# Patient Record
Sex: MALE | Race: WHITE
[De-identification: names, ages, dates, MRNs, and addresses within clinical notes are randomized per-mention and may not be internally consistent; named-entity substitution may affect disease eponyms.]

---

## 2017-01-28 ENCOUNTER — HOSPITAL ENCOUNTER (OUTPATIENT)
Dept: HOSPITAL 53 - M SLEEP | Age: 57
End: 2017-01-28
Attending: INTERNAL MEDICINE
Payer: COMMERCIAL

## 2017-01-28 DIAGNOSIS — G47.33: Primary | ICD-10-CM

## 2017-01-28 DIAGNOSIS — G47.61: ICD-10-CM

## 2017-01-30 NOTE — SLEEPCENT
DATE OF PROCEDURE:  01/28/2017

 

REFERRING PHYSICIAN: FRANCESCO Orozco

 

INTERPRETATION: Nocturnal polysomnography was performed for the determination of

pressure therapy in this person with presumed mild obstructive sleep apnea with

an respiratory event index (FAYE) of 14.1 with oxygen saturation stacy at 79% and

average saturation of 89%. There were associated symptoms of excessive daytime

sleepiness, insomnia, snoring, observed apnea, and nonrestorative sleep.

 

A total of 6 hours and 47 minutes of data was reviewed with 271 minutes of sleep

identified. Sleep latency was 12 minutes. Rapid eye movement (REM) latency was

240 minutes. No slow wave sleep was identified. Sleep efficiency was reduced at

67.8%. EKG showed normal sinus rhythm with an average heart rate of 65 beats 
per minute.

Speeding and slowing was noted surrounding some respiratory events. No

epileptiform discharge observed. The patient had been fit with a Respironics

ComfortGel nasal petite mask, 4 cm of water then applied to the circuit and the

lights were dimmed. Continuous positive airway pressure (CPAP) begun at 4 cm of

water pressure, taken to a high of 6. However, he appeared to do best on a

pressure of 5. On that pressure, his apnea-hypopnea index (AHI) was 0 and his 
FAYE

was 2.3. Oxygen saturation stacy appeared to be in the 90th percentile. Periodic

limb movement index was 14. Supine REM sleep was seen on this pressure with a

reasonably good waveform.

 

IMPRESSION:

1.  Obstructive sleep apnea, mild, reasonably palliated on CPAP of 5 cm of water

pressure.

2. Periodic limb movement, mild.

 

RECOMMENDATIONS:

Recommend continuation of CPAP therapy at the above pressure via a petite

Respironics ComfortGel nasal mask, the mask of his preference. Clinical

correlation will be necessary to ensure eradication of symptoms.

[facility]

## 2017-12-27 ENCOUNTER — HOSPITAL ENCOUNTER (EMERGENCY)
Dept: HOSPITAL 53 - M ED | Age: 57
Discharge: HOME | End: 2017-12-27
Payer: COMMERCIAL

## 2017-12-27 DIAGNOSIS — J44.1: Primary | ICD-10-CM

## 2017-12-27 DIAGNOSIS — Z79.899: ICD-10-CM

## 2017-12-27 DIAGNOSIS — K21.9: ICD-10-CM

## 2017-12-27 DIAGNOSIS — F41.9: ICD-10-CM

## 2017-12-27 DIAGNOSIS — F17.210: ICD-10-CM

## 2017-12-27 DIAGNOSIS — R53.81: ICD-10-CM

## 2017-12-27 PROCEDURE — 71020: CPT

## 2017-12-31 LAB
B BURGDOR IGG+IGM SER-ACNC: 1.17 ISR (ref 0–0.9)
B BURGDOR IGM SER IA-ACNC: 4.05 INDEX (ref 0–0.79)

## 2018-02-12 ENCOUNTER — HOSPITAL ENCOUNTER (OUTPATIENT)
Dept: HOSPITAL 53 - M RAD | Age: 58
End: 2018-02-12
Attending: INTERNAL MEDICINE
Payer: COMMERCIAL

## 2018-02-12 DIAGNOSIS — Z87.891: ICD-10-CM

## 2018-02-12 DIAGNOSIS — J44.9: Primary | ICD-10-CM

## 2020-04-27 ENCOUNTER — HOSPITAL ENCOUNTER (OUTPATIENT)
Dept: HOSPITAL 53 - M RAD | Age: 60
End: 2020-04-27
Attending: SPECIALIST
Payer: COMMERCIAL

## 2020-04-27 DIAGNOSIS — J84.10: ICD-10-CM

## 2020-04-27 DIAGNOSIS — Z12.2: Primary | ICD-10-CM

## 2020-04-27 DIAGNOSIS — Z87.891: ICD-10-CM

## 2020-04-27 DIAGNOSIS — J98.11: ICD-10-CM

## 2020-04-27 NOTE — REP
CT CHEST WITHOUT CONTRAST:  LOW-DOSE CANCER SCREENING.

 

HISTORY:  Nicotine dependence.

 

Comparison CT studies of the chest are reviewed from February 12, 2018, November 4, 2014, and February 8, 2010.

 

FINDINGS:  Digital  radiograph demonstrates hyperinflation.  Axial CT images

again show severe emphysematous hyperinflation and oligemia in the upper lobes

and to a slightly lesser extent, in the lower lobes.  This is unchanged in

appearance morphologically.  No pulmonary mass lesion is appreciated.  No

significant pulmonary nodule is seen.  No endobronchial lesion is appreciated.

No pleural or pericardial effusion is seen.  There is a chronic area of fibrosis

and discoid atelectasis medially in the right middle lobe, unchanged.

 

IMPRESSION:

 

Lung-RADS category 2 findings.  Repeat screening chest CT study suggested in 1

year.

 

 

Electronically Signed by

Rolando Mullins MD 04/27/2020 01:36 P

## 2020-12-10 ENCOUNTER — HOSPITAL ENCOUNTER (OUTPATIENT)
Dept: HOSPITAL 53 - M LABSMTC | Age: 60
End: 2020-12-10
Attending: PEDIATRICS
Payer: SELF-PAY

## 2020-12-10 DIAGNOSIS — Z20.828: Primary | ICD-10-CM

## 2020-12-23 ENCOUNTER — HOSPITAL ENCOUNTER (OUTPATIENT)
Dept: HOSPITAL 53 - M SMT | Age: 60
End: 2020-12-23
Attending: NURSE PRACTITIONER
Payer: COMMERCIAL

## 2020-12-23 DIAGNOSIS — N40.1: Primary | ICD-10-CM

## 2020-12-23 LAB
APPEARANCE UR: CLEAR
BACTERIA UR QL AUTO: NEGATIVE
BILIRUB UR QL STRIP.AUTO: NEGATIVE
GLUCOSE UR QL STRIP.AUTO: NEGATIVE MG/DL
HGB UR QL STRIP.AUTO: NEGATIVE
KETONES UR QL STRIP.AUTO: NEGATIVE MG/DL
LEUKOCYTE ESTERASE UR QL STRIP.AUTO: NEGATIVE
NITRITE UR QL STRIP.AUTO: NEGATIVE
PH UR STRIP.AUTO: 6 UNITS (ref 5–9)
PROT UR QL STRIP.AUTO: NEGATIVE MG/DL
RBC # UR AUTO: 0 /HPF (ref 0–3)
SP GR UR STRIP.AUTO: 1 (ref 1–1.03)
SQUAMOUS #/AREA URNS AUTO: 0 /HPF (ref 0–6)
UROBILINOGEN UR QL STRIP.AUTO: 0.2 MG/DL (ref 0–2)
WBC #/AREA URNS AUTO: 0 /HPF (ref 0–3)

## 2020-12-29 ENCOUNTER — HOSPITAL ENCOUNTER (EMERGENCY)
Dept: HOSPITAL 53 - M ED | Age: 60
Discharge: HOME | End: 2020-12-29
Payer: COMMERCIAL

## 2020-12-29 VITALS — SYSTOLIC BLOOD PRESSURE: 123 MMHG | DIASTOLIC BLOOD PRESSURE: 66 MMHG

## 2020-12-29 VITALS — WEIGHT: 114.42 LBS | BODY MASS INDEX: 19.53 KG/M2 | HEIGHT: 64 IN

## 2020-12-29 DIAGNOSIS — K27.9: Primary | ICD-10-CM

## 2020-12-29 DIAGNOSIS — Z87.891: ICD-10-CM

## 2020-12-29 DIAGNOSIS — K44.9: ICD-10-CM

## 2020-12-29 DIAGNOSIS — Z79.899: ICD-10-CM

## 2020-12-29 DIAGNOSIS — J45.909: ICD-10-CM

## 2020-12-29 DIAGNOSIS — K21.9: ICD-10-CM

## 2020-12-29 DIAGNOSIS — J44.9: ICD-10-CM

## 2020-12-29 DIAGNOSIS — F32.9: ICD-10-CM

## 2020-12-29 DIAGNOSIS — F41.9: ICD-10-CM

## 2020-12-29 LAB
ALBUMIN SERPL BCG-MCNC: 3.7 GM/DL (ref 3.2–5.2)
ALT SERPL W P-5'-P-CCNC: 22 U/L (ref 12–78)
BASOPHILS # BLD AUTO: 0.1 10^3/UL (ref 0–0.2)
BASOPHILS NFR BLD AUTO: 0.8 % (ref 0–1)
BILIRUB CONJ SERPL-MCNC: 0.2 MG/DL (ref 0–0.2)
BILIRUB SERPL-MCNC: 0.5 MG/DL (ref 0.2–1)
BUN SERPL-MCNC: 8 MG/DL (ref 7–18)
CALCIUM SERPL-MCNC: 9.3 MG/DL (ref 8.5–10.1)
CHLORIDE SERPL-SCNC: 102 MEQ/L (ref 98–107)
CK MB CFR.DF SERPL CALC: 2.29
CK MB SERPL-MCNC: 2.5 NG/ML (ref ?–3.6)
CK SERPL-CCNC: 109 U/L (ref 39–308)
CO2 SERPL-SCNC: 33 MEQ/L (ref 21–32)
CREAT SERPL-MCNC: 0.76 MG/DL (ref 0.7–1.3)
EOSINOPHIL # BLD AUTO: 0 10^3/UL (ref 0–0.5)
EOSINOPHIL NFR BLD AUTO: 0.1 % (ref 0–3)
GFR SERPL CREATININE-BSD FRML MDRD: > 60 ML/MIN/{1.73_M2} (ref 56–?)
GLUCOSE SERPL-MCNC: 98 MG/DL (ref 70–100)
HCT VFR BLD AUTO: 48 % (ref 42–52)
HGB BLD-MCNC: 15.5 G/DL (ref 13.5–17.5)
LYMPHOCYTES # BLD AUTO: 1.5 10^3/UL (ref 1.5–5)
LYMPHOCYTES NFR BLD AUTO: 19.1 % (ref 24–44)
MCH RBC QN AUTO: 32.4 PG (ref 27–33)
MCHC RBC AUTO-ENTMCNC: 32.3 G/DL (ref 32–36.5)
MCV RBC AUTO: 100.2 FL (ref 80–96)
MONOCYTES # BLD AUTO: 0.8 10^3/UL (ref 0–0.8)
MONOCYTES NFR BLD AUTO: 10.2 % (ref 0–5)
NEUTROPHILS # BLD AUTO: 5.4 10^3/UL (ref 1.5–8.5)
NEUTROPHILS NFR BLD AUTO: 69.4 % (ref 36–66)
PLATELET # BLD AUTO: 330 10^3/UL (ref 150–450)
POTASSIUM SERPL-SCNC: 4.4 MEQ/L (ref 3.5–5.1)
PROT SERPL-MCNC: 7.5 GM/DL (ref 6.4–8.2)
RBC # BLD AUTO: 4.79 10^6/UL (ref 4.3–6.1)
SODIUM SERPL-SCNC: 139 MEQ/L (ref 136–145)
TROPONIN I SERPL-MCNC: < 0.02 NG/ML (ref ?–0.1)
WBC # BLD AUTO: 7.8 10^3/UL (ref 4–10)

## 2020-12-29 PROCEDURE — 85025 COMPLETE CBC W/AUTO DIFF WBC: CPT

## 2020-12-29 PROCEDURE — 93005 ELECTROCARDIOGRAM TRACING: CPT

## 2020-12-29 PROCEDURE — 74177 CT ABD & PELVIS W/CONTRAST: CPT

## 2020-12-29 PROCEDURE — 82550 ASSAY OF CK (CPK): CPT

## 2020-12-29 PROCEDURE — 82553 CREATINE MB FRACTION: CPT

## 2020-12-29 PROCEDURE — 71045 X-RAY EXAM CHEST 1 VIEW: CPT

## 2020-12-29 PROCEDURE — 99285 EMERGENCY DEPT VISIT HI MDM: CPT

## 2020-12-29 PROCEDURE — 80048 BASIC METABOLIC PNL TOTAL CA: CPT

## 2020-12-29 PROCEDURE — 80076 HEPATIC FUNCTION PANEL: CPT

## 2020-12-29 PROCEDURE — 94760 N-INVAS EAR/PLS OXIMETRY 1: CPT

## 2020-12-29 PROCEDURE — 84484 ASSAY OF TROPONIN QUANT: CPT

## 2020-12-29 PROCEDURE — 93041 RHYTHM ECG TRACING: CPT

## 2020-12-29 RX ADMIN — DIATRIZOATE MEGLUMINE AND DIATRIZOATE SODIUM SCH ML: 600; 100 SOLUTION ORAL; RECTAL at 19:34

## 2020-12-29 RX ADMIN — DIATRIZOATE MEGLUMINE AND DIATRIZOATE SODIUM SCH ML: 600; 100 SOLUTION ORAL; RECTAL at 20:01

## 2020-12-29 NOTE — REPVR
PROCEDURE INFORMATION: 



Exam: CT Abdomen and Pelvis with Contrast 

Exam date and time: 12/29/20  (8:27pm) 

Age: 60 years old 

Clinical indication:  Epigastric pain  



TECHNIQUE: 

Imaging protocol: Computed tomography of the abdomen and pelvis with 

intravenous contrast. 

Radiation optimization: All CT scans at this facility use at least one of these 

dose optimization techniques: automated exposure control; mA and/or kV 

adjustment per patient size (includes targeted exams where dose is matched to 

clinical indication); or iterative reconstruction. 

Contrast material: Isovue 370 Contrast volume: 100 ml  Contrast route:  IV  



COMPARISON: 

No relevant prior studies available 



FINDINGS: 

Lower lung fields:  Emphysematous lung changes.  No basilar infiltrates.  No 

pleural effusions.

Liver: Normal. No solid mass. 

Gallbladder and bile ducts: Normal. No calcified stones. No ductal dilatation. 

Pancreas: Normal. No ductal dilatation. 

Spleen: Normal. No splenomegaly. 

Adrenal glands: Normal. No mass. 

Kidneys and ureters: Normal. No hydronephrosis. 

Stomach and bowel:  Findings in the left mid abdomen compatible with a 

non-obstructing small bowel (probably jejeunal) intussusception.  A 'bull's 

eye' appearance of small bowel is noted (Ser. 201 - Image #45).  

   Much fecal matter in the colon (especially from the cecum to the splenic 

flexure).

Appendix: No evidence of appendicitis. 



Intraperitoneal space: Unremarkable. No free air. No significant fluid 

collection. 

Vasculature: Unremarkable. No abdominal aortic aneurysm. 

Lymph nodes: Unremarkable. No enlarged lymph nodes. 



Urinary bladder:  Distended urinary bladder.  No stones nor mass. 

Reproductive:  Enlarged prostate gland.



Bones/joints: Unremarkable. No acute fracture. 

Soft tissues:  Paucity of fat in the abdomen and pelvis. 



IMPRESSION: 

Suspect a non-obstructing jejunal intussusception, in the left mid abdomen.

No abscess.  No free air.

Much fecal matter in the colon -- perhaps an element of constipation, eg.



Electronically signed by: Ilana Thakkar On 12/29/2020  21:27:10 PM

## 2020-12-29 NOTE — REP
INDICATION:

DYSPNEA/COUGH.



COMPARISON:

Comparison chest x-ray December 27, 2017.



TECHNIQUE:

Portable upright AP chest radiograph.



FINDINGS:

The lungs are hyperinflated with emphysematous changes in the upper lobes bilaterally

consistent with COPD.  Interstitial markings are very slightly prominent in the bases.

These findings are unchanged.  The heart is not enlarged.  Monitoring electrodes are

seen.  Pulmonary vasculature is not increased..   Six no significant bony abnormality

is seen.



IMPRESSION:

Evidence of COPD with hyperinflation and emphysematous changes in the upper lobes.  No

acute infiltrate..





<Electronically signed by Yohan Mullins > 12/29/20 1713

## 2020-12-31 NOTE — ECGEPIP
[facility] - ED

                                       

                                       Test Date:    2020

Pat Name:     LORI MARX              Department:   

Patient ID:   [id number]                 Room:         -

Gender:       Male                     Technician:   

:          1960               Requested By: NICHOLE MINOR

Order Number: [id number]     Reading MD:   Lilia Echavarria

                                 Measurements

Intervals                              Axis          

Rate:         82                       P:            82

GA:           164                      QRS:          49

QRSD:         93                       T:            72

QT:           346                                    

QTc:          406                                    

                           Interpretive Statements

SINUS RHYTHM

NONSPECIFIC ST ELEVATION, CLINICAL CORRELATION

NO PRIOR

Electronically Signed on 2020 13:05:40 EST by Lilia Echavarria

## 2021-01-29 ENCOUNTER — HOSPITAL ENCOUNTER (EMERGENCY)
Dept: HOSPITAL 53 - M ED | Age: 61
Discharge: HOME | End: 2021-01-29
Payer: COMMERCIAL

## 2021-01-29 VITALS — SYSTOLIC BLOOD PRESSURE: 106 MMHG | DIASTOLIC BLOOD PRESSURE: 67 MMHG

## 2021-01-29 VITALS — WEIGHT: 111.33 LBS | HEIGHT: 64 IN | BODY MASS INDEX: 19.01 KG/M2

## 2021-01-29 DIAGNOSIS — I25.2: ICD-10-CM

## 2021-01-29 DIAGNOSIS — Z87.891: ICD-10-CM

## 2021-01-29 DIAGNOSIS — F32.9: ICD-10-CM

## 2021-01-29 DIAGNOSIS — R43.9: ICD-10-CM

## 2021-01-29 DIAGNOSIS — Z20.822: ICD-10-CM

## 2021-01-29 DIAGNOSIS — Z79.899: ICD-10-CM

## 2021-01-29 DIAGNOSIS — J06.9: Primary | ICD-10-CM

## 2021-01-29 DIAGNOSIS — J45.909: ICD-10-CM

## 2021-01-29 DIAGNOSIS — J43.9: ICD-10-CM

## 2021-01-29 DIAGNOSIS — R51.9: ICD-10-CM

## 2021-01-29 DIAGNOSIS — F41.9: ICD-10-CM

## 2021-01-29 DIAGNOSIS — R68.83: ICD-10-CM

## 2021-01-29 DIAGNOSIS — K21.9: ICD-10-CM

## 2021-01-29 DIAGNOSIS — J44.9: ICD-10-CM

## 2021-01-29 DIAGNOSIS — K57.92: ICD-10-CM

## 2021-01-29 DIAGNOSIS — A69.20: ICD-10-CM

## 2021-01-29 PROCEDURE — 71046 X-RAY EXAM CHEST 2 VIEWS: CPT

## 2021-01-29 PROCEDURE — 99283 EMERGENCY DEPT VISIT LOW MDM: CPT

## 2021-01-29 PROCEDURE — 87804 INFLUENZA ASSAY W/OPTIC: CPT

## 2021-01-29 NOTE — CCD
Summarization of Episode Note

                             Created on: 2021



CHINGLORI HARRINGTON

External Reference #: [id number]

: 1960

Sex: Male



Demographics





                          Address                   [address]

[place]  [address]

 

                          Home Phone                [phone]

 

                          Preferred Language        Unknown

 

                          Marital Status            Unknown

 

                          Yarsanism Affiliation     Unknown

 

                          Race                      White

 

                          Ethnic Group              Not  or 





Author





                          Author                    [facility]

[facility]

 

                          Organization              [facility]

[facility]

 

                          Address                   Unknown

 

                          Phone                     Unavailable







Support





                Name            Relationship    Address         Phone

 

                    LORI MARX        MARISABEL                [address]

[place], [place]  [id number] [phone]

 

                    David Marx          PRETTY                [address]

[place]  [address]                     [phone]







Care Team Providers





                    Care Team Member Name Role                Phone

 

                    Erick Pascual      Unavailable         [phone]







PROBLEMS





          Type      Condition ICD9-CM Code QME87-GS Code Onset Dates Condition S

tatus SNOMED 

Code                                    Notes

 

             Problem      Benign prostatic hyperplasia with lower urinary tract 

symptoms              N40.1         

                    Active              [id number]       

 

        Problem Balanitis         N48.1           Active  [id number]  







ALLERGIES

No Known Allergies



ENCOUNTERS from 1960 to 2021





             Encounter    Location     Date         Provider     Diagnosis

 

                [facility]    [address] [place] [address] 23 Dec

, 2020    Erick Pascual

                                        Benign prostatic hyperplasia with lower 

urinary tract symptoms N40.1 ; Balanitis

N48.1 and Prostate cancer screening Z12.5







IMMUNIZATIONS

No Information



SOCIAL HISTORY

Tobacco Use:



                    Social History Observation Description         Date

 

                    Details (start date - stop date) Former Smoker        



Sex Assigned At Birth:



                          Social History Observation Description

 

                          Sex Assigned At Birth     Unknown



Sexual Hx:



                    Question            Answer              Notes

 

                    Had sex in the last 12 months (vaginal, oral, or anal)? Yes 

                 

 

                    Have you ever had an STD? Yes                  

 

                    with                Women only           

 

                    Use protection?     No                   



Alcohol Screening:



                    Question            Answer              Notes

 

                    Did you have a drink containing alcohol in the past year? No

                   

 

                    Points              0                    

 

                    Interpretation      Negative             



Tobacco Use:



                    Question            Answer              Notes

 

                    Are you a:          former smoker       quit 2 years ago







REASON FOR REFERRAL

No Information



VITAL SIGNS





                    Weight              110 lbs             23 Dec, 2020

 

                    Height              64'' in             23 Dec, 2020

 

                    BMI                 0.13 kg/m2          23 Dec, 2020

 

                    Heart Rate          86 /min             23 Dec, 2020

 

                    Respiratory Rate    20 /min             23 Dec, 2020

 

                    Temperature         98.2 degrees Fahrenheit 23 Dec, 2020

 

                    Oximetry            91%                 23 Dec, 2020

 

                    Blood pressure systolic 122 mm Hg           23 Dec, 2020

 

                    Blood pressure diastolic 82 mm Hg            23 Dec, 2020







MEDICATIONS





           Medication SIG (Take, Route, Frequency, Duration) Notes      Start Da

te End Date   

Status

 

           Levalbuterol Tartrate 45 MCG/ACT 1 puff as needed Inhalation every 4 

hrs                                  

Active

 

           Mucinex 600 MG 1 tablet as needed Orally every 12 hrs                

                  Not-Taking

 

                          Ketoconazole 2 %          1 application to affected ar

ea Externally Once a day for 30 

days                            29 Dec, 2016                    Not-Taking

 

           Anoro Ellipta 62.5-25 MCG/INH 1 puff Inhalation Once a day           

                       Not-Taking

 

           Albuterol-Ipratropium 2.5-0.5 MG/3ML 3 ml Inhalation every 6 hrs     

                             Active

 

           Multi Complete -  Orally                                     Not-Taki

ng

 

           Apple Cider Vinegar                                             Activ

e

 

           Omeprazole 40 MG 1 capsule Orally Once a day                         

         Not-Taking

 

                Cipro 500 MG    1 tablet Orally ONE HOUR PRIOR TO PROCEDURE for 

1 dose(s)                 20 Mar,

2017                                                Not-Taking

 

                Flonase Allergy Relief 50 MCG/ACT 1 spray in each nostril Nasall

y Once a day                  

                                                    Not-Taking

 

                Ketoconazole 2 % 1 application to affected area Externally BID f

or 30 day(s)                 

                                        Active

 

           Budesonide 1 MG/2ML 4 ml Inhalation Four times a day                 

                 Active

 

           Spiriva HandiHaler                                             Active

 

           Simvastatin 20 MG 1 tablet in the evening Orally Once a day for 30 da

y(s)                                  

Active

 

           Diflucan 100 MG 1 tablet Orally Once a day for 7 day(s)            05

 Dec, 2016            

Not-Taking

 

           Stiolto Respimat 2.5-2.5 MCG/ACT 2 puffs Inhalation Once a day       

                           Active

 

                          Sulfamethoxazole-Trimethoprim 800-160 MG 1 tablet 1 ho

ur prior to your 

cystoscopy Orally Once for 1 days                 23 Dec, 2020                  

  Active

 

           Vitamin D3 Ultra Potency 1.25 MG (74374 UT) 1 tablet Orally for 30 da

y(s)                                  

Active

 

           Flomax 0.4 MG 1 capsule Orally Once a day for 30                     

             Active

 

           Ginseng                                                Active

 

           Pantoprazole Sodium 40 MG 1 tablet Orally Once a day for 30 day(s)   

                               Active

 

           Aspir-Low 81 MG 1 tablet Orally Once a day for 30 day(s)             

                     Active

 

           Clotrimazole 10 MG 1 ed Mouth/Throat Five times a day for 14 day(

s)                                  

Active

 

                          Lidocaine HCl Jelly USP 2 % 5 ML1 application to affec

michael area as needed 

Intravesically TIME AT CYSTOSCOPY for 1 dose(s)                     

                Not-Taking

 

           BusPIRone HCl 7.5 MG 1 tablet Orally Twice a day                     

             Not-Taking

 

             Pepcid 20 MG 1 tablet at bedtime as needed Orally Once a day for 30

 day(s)                            

                                        Active

 

           Simethicone                                             Active







PROCEDURES

No Information



RESULTS





                    Component           Value               Reference Range

 

                                        UA URINALYSIS

Reviewed date:2020 14:33:51

Interpretation:

Performing Lab:[facility] [address] [facility] [address] [phone], Phone:[phone],[facility] 

 

                                        URINE CULTURE

Reviewed date:2020 09:28:14

Interpretation:

Performing Lab:[facility] [address] [facility] [address] [phone], Phone:[phone],[facility] 







REASON FOR VISIT

bph



MEDICAL (GENERAL) HISTORY





                    Type                Description         Date

 

                    Medical History     COPD                 

 

                    Medical History     GERD                 

 

                    Surgical History    colonoscopy         

 

                    Surgical History    hemorroidectomy      

 

                    Surgical History    tonsil removed at 13  







Goals Section

No Information



Health Concerns

No Information



MEDICAL EQUIPMENT

No Information



MENTAL STATUS

No Information



FUNCTIONAL STATUS

No Information



ASSESSMENTS





             Encounter Date Diagnosis    Assessment Notes Treatment Notes Treatm

ent Clinical 

Notes

 

                          23 Dec, 2020              Benign prostatic hyperplasia

 with lower urinary tract symptoms 

(ICD-10 - N40.1)                                    



                                        



Will send UA and culture today.



PVR today is 12 cc.



His obstructive LUTS are getting worse despite being on Flomax.  Recommended 
doing a cystoscopy to better evaluate.  Procedure and consent reviewed and 
signed, abx sent to pharmacy.

 

                23 Dec, 2020    Balanitis (ICD-10 - N48.1)                 



                                        



Will give a refill of cream to help with intermintent balantis.

 

                23 Dec, 2020    Prostate cancer screening (ICD-10 - Z12.5)      

           



                                        



Order given to do PSA prior to next visit.

 

                23 Dec, 2020    Other                           Patient Educated

 with: Cystoscopy (In-Office) Procedure and

Instructions.pdf (Cystoscopy (In-Office) Procedure and 
Instructions.pdf),Cystoscopy material was printed,Cystoscopy material was 
printed,Cystoscopy home care material was printed  







PLAN OF TREATMENT

Medication



                Medication Name Sig             Start Date      Stop Date

 

                          Sulfamethoxazole-Trimethoprim 800-160 MG 1 tablet 1 ho

ur prior to your 

cystoscopy Orally Once for 1 days 23 Dec, 2020               

 

                    Ketoconazole 2 %    1 application to affected area Externall

y BID for 30 day(s)                                 



Treatment Notes



                    Assessment          Notes               Clinical Notes

 

                    Benign prostatic hyperplasia with lower urinary tract sympto

ms                     Will send UA 

and culture today.PVR today is 12 cc.His obstructive LUTS are getting worse 
despite being on Flomax.  Recommended doing a cystoscopy to better evaluate.  
Procedure and consent reviewed and signed, abx sent to pharmacy.

 

                    Beryl                               Will give a refill o

f cream to help with intermintent balantis.

 

                    Prostate cancer screening                     Order given to

 do PSA prior to next visit.



Treatment Notes



                          Test Name                 Order Date

 

                          PSA SCREENING             2021

 

                          uro PVR (Post Voiding Residual) Bladder Scan 

4



Next Appt



                                        Details

 

                                        cystoscopy Reason:BPH with LUTS

 

                                        Provider Name:Martin Finney,  08:00:00 AM, [address], 

[place], [address], [phone]



Follow Up:cystoscopyBPH with LUTS



Insurance Providers





             Payer Name   Payer Address Payer Phone  Insured Name Patient Relati

onship to 

Insured                   Coverage Start Date       Coverage End Date

 

                [employer] [facility] [address]  [place] [address] [phone]    

LORI MARX       self

## 2021-01-29 NOTE — REP
INDICATION:

sob.



COMPARISON:

Comparison chest x-ray December 29, 2020.



TECHNIQUE:

Two views..



FINDINGS:

The lungs are quite hyperinflated consistent with COPD.  No acute infiltrate is seen.

Pleural angles are sharp.  Diaphragms are somewhat inverted.  Emphysematous changes

are noted throughout the upper lung zones as before.  Heart size is normal.  No

significant bony abnormality is seen.  The



IMPRESSION:

Significant hyperinflation consistent with advanced COPD emphysematous changes in the

upper lobes.  No infiltrate seen..





<Electronically signed by Yohan Mullins > 01/29/21 1234

## 2021-01-29 NOTE — CCD
Continuity of Care Document (CCD)

                             Created on: 01/15/2021



Neftaly Guillory

External Reference #: [id number]

: 1960

Sex: Male



Demographics





                          Address                   [address] [place] [address]

 

                          Home Phone                [phone]

 

                          Preferred Language        Unknown

 

                          Marital Status            

 

                          Gnosticism Affiliation     Unknown

 

                          Race                      White

 

                          Ethnic Group              Not  or 





Author





                          Author                    Neftaly MORALES PA-C

 

                          Organization              Unknown

 

                          Address                   [address] [place] [address]



 

                          Phone                     [phone]







Care Team Providers





                    Care Team Member Name Role                Phone

 

                    [facility] AUTM                [phone]

 

                    [facility] AUTM                [phone]

 

                    Daisy Morales PA-C  AUTM                [phone]

 

                    Sharath Castillo MD AUTM                [phone]

 

                    Solitario Latif MD    AUTM                [phone]

 

                    [facility]         AUTM                [phone]







Problems





                    Active Problems     Provider            Date

 

                    Obstructive sleep apnea syndrome JUAN ANTONIO Taylor  Onset: 

2019

 

                    Tobacco user        JUAN ANTONIO Taylor  Onset: 2019

 

                    Chronic obstructive lung disease JUAN ANTONIO Taylor  Onset: 

2019







Social History





                Type            Date            Description     Comments

 

                Birth Sex                       Unknown          

 

                Tobacco Use     Start: Unknown  Current Cigar Smoker 1 Daily  

 

                ETOH Use                        Denies alcohol use  

 

                Recreational Drug Use                 Denies Drug Use  

 

                Tobacco Use     Start: Unknown End: Unknown Patient is a former 

smoker quit  

 

                Smoking Status  Reviewed: 19 Patient is a former smoker qu

it  







Allergies, Adverse Reactions, Alerts





             Active Allergies Reaction     Severity     Comments     Date

 

             NKDA                                                2019

 

             NKFA                                                2019

 

             [employer]                                                2019







Medications





           Active Medications SIG        Qnty       Indications Ordering Provide

r Date

 

                          Pepcid                     20mg Tablets               

    1 by mouth twice a day

as needed gerd  180tabs                         Toro Ny MD 2020

 

                                        Vitamin D3 Ultra Potency                

     1.25mg (43871 Ut) Tablets          

             1 tab by mouth every week 12tabs                    Toro Ny MD 2020

 

           Patient Unable To Tolerate Cpap, Please Evaluate                     

             Toro Ny MD 

2019

 

                          Pantoprazole Sodium                     40mg Tablets D

R                   1 by 

mouth every day 30tabs                          Toro Ny MD 2019

 

                          Nebulizer Kit/Tubing/Mouthpiece                      K

it                   use 

with neb tx every 4-6 hours as needed. dx j44.1 4units                          

Toro Ny MD 

2019

 

                          Nebulizer                      Device                 

  use every 4-6 hours as 

needed for wheezing and/or shortness of breath 1units          J44.9           H

arari Ny MD 

2019

 

                          Simvastatin                     20mg Tablets          

         Take 1 Tablet By 

Mouth Every Day 90tabs          E78.5           Toro Ny MD 2019

 

                          Tamsulosin HCL                     0.4mg Capsules     

              1 tab by 

mouth every day 90caps                          Anat Sharma ANP-BC, PNP 

00

 

           Apple Cider Vinegar Tablets                                  Unknown 

   

 

                          Levalbuterol Tartrate                     45mcg/Act Ae

rosol                   

Inl 2 PFS PO Q 4 H prn                                 Unknown         

0

 

                          Stiolto Respimat                     2.5-2.5mcg/Act Ae

rosol                   

Inl 2 PFS PO qd                                 Unknown         

 

                          Budesonide                     1mg/2ML Suspension     

              Inhale 

Contents Of 2 Vials Via Nebulizer Twice A Day                                 Un

known         

 

                          Aspirin 81                     81mg Tablets DR        

           1 by mouth 

every day                                       Unknown         

 

           Clotrimazole                     10mg Zack                         

                           Unknown    



 

                          Sucralfate                     1gm Tablets            

       Take 1 Tablet By 

Mouth Four Times Daily For 4 Weeks 360tabs                         Toro doherty MD 







Immunizations





                                        Description

 

                                        No Information Available







Vital Signs





                Date            Vital           Result          Comment

 

                2020  9:27am BP Systolic     122 mmHg         

 

                    BP Diastolic        80 mmHg              

 

                    Heart Rate          100 /min             

 

                    Body Temperature    97.1 F             

 

                    Respiratory Rate    16 /min              

 

                    O2 % BldC Oximetry  96 %                 

 

                    Weight              113.25 lb            

 

                    Weight              51.370 kg            

 

                    Height              64 inches           5'4"

 

                    BMI (Body Mass Index) 19.4 kg/m2           

 

                    BSA (Body Surface Area) 1.54 m2              

 

                08/10/2020  8:54am BP Systolic     112 mmHg         

 

                    BP Diastolic        64 mmHg              

 

                    Heart Rate          90 /min              

 

                    Body Temperature    97.2 F             

 

                    Respiratory Rate    16 /min              

 

                    O2 % BldC Oximetry  95 %                 

 

                    Weight              106.38 lb            

 

                    Weight              48.252 kg            

 

                    Height              64 inches           5'4"

 

                    BMI (Body Mass Index) 18.3 kg/m2           

 

                    BSA (Body Surface Area) 1.50 m2              







Results





        Test    Acquired Date Facility Test    Result  H/L     Range   Note

 

                    CBC With Differential 2020          [facility]

             White Blood Count 7.8 10       Normal       4.0-10.0      

 

             Red Blood Count 4.79 10      Normal       4.30-6.10     

 

             Hemoglobin   15.5 g/dL    Normal       13.5-17.5     

 

             Hematocrit   48.0 %       Normal       42.0-52.0     

 

             Mean Corpuscular Volume 100.2 fl     High         80.0-96.0     

 

             Mean Corpuscular Hemoglobin 32.4 pg      Normal       27.0-33.0    

 

 

             Mean Corpuscular HGB Conc 32.3 g/dL    Normal       32.0-36.5     

 

             Red Cell Distribution Width 12.9 %       Normal       11.5-14.5    

 

 

             Platelet Count, Automated 330 10       Normal       150-450       

 

             Neutrophils % 69.4 %       High         36.0-66.0     

 

             Lymph %      19.1 %       Low          24.0-44.0     

 

             Mono %       10.2 %       High         0.0-5.0       

 

             Eos %        0.1 %        Normal       0.0-3.0       

 

             Baso %       0.8 %        Normal       0.0-1.0       

 

             Immature Granulocyte % 0.4 %        Normal       0-3.0         

 

             Nucleated Red Blood Cell % 0.0 %        Normal       0-0           

 

             Neutrophils # 5.4 10       Normal       1.5-8.5       

 

             Lymph #      1.5 10       Normal       1.5-5.0       

 

             Mono #       0.8 10       Normal       0.0-0.8       

 

             Eos #        0.0 10       Normal       0.0-0.5       

 

             Baso #       0.1 10       Normal       0.0-0.2       

 

                    Cardiac Marker Panel 2020          [facility]

             CPK Creatine Phosphokinase 109 U/L      Normal               

 

             CK-MB Value Mass 2.5 NG/ML    Normal       <3.6          

 

             MB/CK Relative Index 2.29         Normal       < Or =4      1

 

             Troponin I   < 0.02 NG/ML Normal       < 0.10       2

 

                    Liver Profile       2020          [facility]

             Ast/Sgot     20 U/L       Normal       7-37          

 

             Alt/SGPT     22 U/L       Normal       12-78         

 

             Alkaline Phosphatase 121 U/L      High                 

 

             Bilirubin,Total 0.5 mg/dL    Normal       0.2-1.0       

 

             Bilirubin,Direct 0.2 mg/dL    Normal       0.0-0.2       

 

             Total Protein 7.5 GM/DL    Normal       6.4-8.2       

 

             Albumin      3.7 GM/DL    Normal       3.2-5.2       

 

             Albumin/Globulin Ratio 1.0          Normal                     

 

                    Basic Metabolic Profile 2020          [facility]

             Glucose, Fasting 98 mg/dL     Normal               

 

             Blood Urea Nitrogen 8 mg/dL      Normal       7-18          

 

             Creatinine For GFR 0.76 mg/dL   Normal       0.70-1.30     

 

             Glomerular Filtration Rate > 60.0       Normal       >56          3

 

             Sodium Level 139 mEq/L    Normal       136-145       

 

             Potassium Serum 4.4 mEq/L    Normal       3.5-5.1       

 

             Chloride Level 102 mEq/L    Normal               

 

             Carbon Dioxide Level 33 mEq/L     High         21-32         

 

             Anion Gap    4 mEq/L      Low          8-16          

 

             Calcium Level 9.3 mg/dL    Normal       8.5-10.1      

 

                    Coronavirus 2019 ([facility]) 12/10/2020          [facility]

             Coronavirus 2019 ([facility]) ---------------- <SEE NOTE>                 

           4

 

                    Laboratory test finding 08/10/2020          [facility]

l

             Hgba1c       <pending>                               

 

             TSH Highly Sensitive <pending>                               

 

             Vitamin B12 Serum <pending>                               

 

             Vitamin D (25-Hydroxy) <pending>                               

 

             Folic Acid RBC <pending>                               

 

                    Lab - Unable To Process Specimen 08/10/2020          [facility]

             Lab - Specimen Rejec (SEE NOTE)                             5

 

             Test(s) Ordered FOLIC ACID RBC                             

 

             Rejection Reason QNS                                    6

 

                    Laboratory test finding 08/10/2020          [facility]

l

             Hgba1c       6.0 %                     4.4 - 6.1    7

 

                    CBC W/Automated Diff 08/10/2020          [facility]

             CBC W/Automated Diff (SEE NOTE)                             8

 

             WBC          6.7 10^3/uL               4.2 - 11.0    

 

             RBC          5.00 10^6/uL              4.50 - 6.30   

 

             Hemoglobin   16.6 g/dL    High         14.0 - 16.0   

 

             Hematocrit   49.7 %                    41.0 - 51.0   

 

             MCV          99.4 fL      High         80.0 - 94.0   

 

             MCH          33.2 pg                   27.0 - 34.0   

 

             MCHC         33.4 g/dL                 31.0 - 36.0   

 

             RDW          13.3 %                    11.5 - 14.8   

 

             Platelets    279 10^3/uL               150 - 450     

 

             MPV          8.8 fL                    7.4 - 10.4    

 

             Neut         69.6 %                    37.0 - 80.0   

 

             Lymph        18.4 %       Low          25.0 - 40.0   

 

             Mono         8.8 %        High         3.0 - 8.0     

 

             Eos          2.1 %                     0.0 - 7.0     

 

             Baso         1.0 %                     0.0 - 2.0     

 

             %Ig          0.1 %        High         0.0 - 0.0     

 

             %NRBC        0.0 %                     0.0 - 0.0     

 

             #Neut        4.64 10^3/uL              2.00 - 6.90   

 

             #Lymph       1.23 10^3/uL              0.60 - 3.40   

 

             #Mono        0.59 10^3/uL              0.00 - 0.90   

 

             #Eos         0.14 10^3/uL              0.00 - 0.70   

 

             #Baso        0.07 10^3/uL              0.00 - 0.20   

 

             #Ig          0.01 10^3/uL              0.00 - 0.10   

 

             #NRBC        0.00 10^3/uL              0.00 - 0.00   

 

             Manual Diff  SEE BELOW                               

 

             Segs         71 %                      37 - 80       

 

             %Lymph       18 %         Low          25 - 40       

 

             %Mono        10 %         High         3 - 8         

 

             %Eos         1 %                       0 - 7         

 

             RBC Morph    MORPH IS NORMAL                             

 

                    Comprehensive Metabolic Panel 08/10/2020          [facility]

             Comprehensive Metabo (SEE NOTE)                             9

 

             Sodium       141 mEq/L                 134 - 153     

 

             Potassium    4.5 mEq/L                 3.6 - 5.0     

 

             Chloride     100 mEq/L                 98 - 107      

 

             Co2          28 mEq/L                  22 - 30       

 

             Glucose      97 mg/dL                  65 - 110      

 

             BUN          11 mg/dL                  7 - 21        

 

             Creatinine   0.8 mg/dL                 0.7 - 1.5     

 

             BUN/Creat    14                        8 - 27        

 

             Total Protein 7.3 g/dL                  6.3 - 8.2     

 

             Albumin      4.7 g/dL                  3.9 - 5.0     

 

             Globulin     2.6 GM/DL                 2.4 - 3.2     

 

             A/G Ratio    1.8                       0.8 - 2.0     

 

             Calcium      9.5 mg/dL                 8.4 - 10.2    

 

             Total Bili   0.7 mg/dL                 0.2 - 1.3     

 

             Alkaline Phos 117 U/L                   38 - 126      

 

             Sgot/Ast     27 U/L                    5 - 40        

 

             SGPT/Alt     25 U/L                    7 - 56        

 

             Anion Gap    13.0 mmol/L               8.0 - 16.0    

 

             Age          60 yrs                                  

 

             Non-Aa GFR   >60 mL/min                              

 

             Afr Amer GFR >60 mL/min                             10

 

                    Cve Panel           08/10/2020          [facility]

             Cve Panel    (SEE NOTE)                             11

 

             Cholesterol  202 mg/dL    High         131 - 200     

 

             Triglycerides 88 mg/dL                  35 - 160      

 

             HDL          69 mg/dL                  29 - 86       

 

             LDL          117 mg/dL                 65 - 175      

 

             Risk Factor  2.9          Low          3.4 - 4.9     

 

             LDL/HDL      1.70                      1.00 - 3.55  12

 

                    Laboratory test finding 08/10/2020          [facility]

l

             TSH Highly Sensitive 0.89 uIU/mL               0.47 - 5.01   

 

             Vitamin D (25-Hydroxy) 23 NG/ML                               13

 

             Vitamin B12 Serum 1084 pg/mL                232 - 1245    







                          1                         DIAGNOSIS CRITERIA

MMB ng/ml       Relative Index (RI)

NON-AMI               < or = 5               N/A

GRAY ZONE              > 5                < or = 4

AMI                    > 5                   > 4



 

                          2                         Troponin I Reference Interva

l for Siemens Vista LOCI:



                                        99th Percentile= 0.00-0.045 ng/ml



Risk Stratification:

<= 0.10 ng/ml   Decreased Risk for Adverse Clinical

Events.

                                        0.10-1.50 ng/ml   Increased Risk for Adv

erse Clinical

Events. Evaluation of additional

criterion and/or repeat testing in 2-6

hours is suggested to rule out myocardial

damage.

>= 1.50 ng/ml   Indicative of Myocardial Injury.



 

                          3                         Units are mL/min/1.73 m2



Chronic Kidney Disease Staging per NKF:



Stage I & II   GFR >=60       Normal to Mildly Decreased

Stage III      GFR 30-59      Moderately Decreased

Stage IV       GFR 15-29      Severely Decreased

Stage V        GFR <15        Very Little GFR Left

ESRD           GFR <15 on RRT



 

                          4                         ----------------------------

--------------------------------

Test: COVID-19 Nasal/Naspharynx

Result: NOT DETECTED           Reference Units: Not detected

                                        ----------------------------------------

--------------------

Note: Please consider re-collection of a new specimen, if

clinically indicated.



Note: The COVID-19 assay is under Emergency Use

Authorization(EUA) by the U.S. Food and Drug Administration.



[facility] is designated as a high complexity

laboratory by the Clinical Laboratory Improvement Amendments

of 1988(CLIA) and is qualified to perform this test.

ASSAY INFORMATION: Real Time RT-PCR





 

                          5                         Specimen Integrity/Specimen 

Recollection

The patient sample needs to be resubmitted for the following reason:



 

                          6                         {          One or more of th

e tests you ordered cannot be performed.

{          Please recollect, reorder, and resubmit if needed.



 

                          7                         {A1]

{HB]



 

                          8                         COMPLETE BLOOD COUNT



 

                          9                         COMPREHENSIVE METABOLIC PANE

L



 

                          10                        Male GFR Interprentation

                                        20-49 yrs     >60 mL/min   Normal

                                        50-59 yrs     >56 mL/min   Normal

                                        60-69 yrs     >49 mL/min   Normal

                                        70-79yrs      >42 mL/min   Normal

                                        80 and above  >35 mL/min   Normal

Female GFR  Interpretation

                                        20-39 yrs     >60 mL/min   Normal

                                        40-49 yrs     >58 mL/min   Normal

                                        50-59 yrs     >51 mL/min   Normal

                                        60-69 yrs     >45 mL/min   Normal

                                        70-79 yrs     >39 mL/min   Normal

                                        80 and above  >32 mL/min   Normal



 

                          11                        LIPID PANEL



 

                          12                        CVE

RISK           CHOL/HDL       LDL/HDL

MEN:

                                        1/2  AVERAGE          3.43          1.00

AVERAGE          4.97          3.55

                                        2X   AVERAGE          9.55          6.25

                                        3X   AVERAGE         23.99          7.99

WOMEN:

                                        1/2  AVERAGE          3.27          1.47

AVERAGE          4.44          3.22

                                        2X   AVERAGE          7.05          5.03

                                        3X   AVERAGE         11.04          6.14



 

                          13                        VITAMIN-D(25HYDROXY)

Deficiency: <=20 ng/ml

Insufficiency: 21-29 ng/ml

Preferred level: => 30 ng/ml









Procedures





                                        Description

 

                                        No Information Available







Medical Devices





                                        Description

 

                                        No Information Available







Encounters





                                        Description

 

                                        No Information Available







Assessments





                Date            Code            Description     Provider

 

                2020      G47.33          Obstructive sleep apnea (adult) 

(pediatric) Daisy Morales PA-C

 

                2020      J44.9           Chronic obstructive pulmonary di

sease, unspecified Daisy Morales PA-C

 

                2020      K21.9           Gastro-esophageal reflux disease

 without esophagitis Daisy Morales PA-C

 

                2020      B37.0           Candidal stomatitis Daisy Morales PA-C

 

                    08/10/2020          Z00.01              Encounter for genera

l adult medical examination with abnormal 

findings                                Daisy Morales PA-C

 

                08/10/2020      R14.0           Abdominal distension (gaseous) E

jennifer Morales PA-C

 

                08/10/2020      G47.33          Obstructive sleep apnea (adult) 

(pediatric) Daisy Morales PA-C

 

                08/10/2020      R23.3           Spontaneous ecchymoses Daisy rabago PA-C

 

                08/10/2020      J44.9           Chronic obstructive pulmonary di

sease, unspecified Daisy Morales PA-C







Plan of Treatment

Future Appointment(s):* 02/15/2021  9:00 am - Daisy Morales PA-C at [facility]





Functional Status





                                        Description

 

                                        No Information Available







Mental Status





                                        Description

 

                                        No Information Available







Referrals





                Refer to      Reason for Referral Status          Appt Date

 

                    [facility]         60 year old male with BPH. Please evalua

te and treat. Thank you.  

Patient Notified                        2020

 

                                        [id number] [facility] [address]

 

                                        [place] [address]

 

                                        [phone]

 

                                          

 

                          Solitario Latif MD          60 year old male with histor

y of weight loss, abdominal 

discomfort, reflux, excessive bloating, and gas. History of colonic polyps. Last
endoscopy/colonoscopy at [facility] 2016. Please evaluate and treat.  Patient 

Notified                                2021

 

                                        [address]

 

                                        [place] [address]

 

                                        [phone]

## 2021-01-29 NOTE — CCD
Summarization Of Episode

                             Created on: 2021



LORI MARX

External Reference #: [id number]

: 1960

Sex: Male



Demographics





                          Address                   [address]

[place]  [address]

 

                          Home Phone                [phone]

 

                          Preferred Language        Unknown

 

                          Marital Status            

 

                          Jewish Affiliation     NONE

 

                          Race                      White

 

                          Ethnic Group              Not  or 





Author





                          Author                    [facility]

 

                          Organization              [facility]

 

                          Address                   Unknown

 

                          Phone                     Unavailable







Support





                Name            Relationship    Address         Phone

 

                [facility]        [place] [address] Unavailable

 

                    NONE, PT PER        Next Of Kin         -

                                        -

                          -, -  -                   -

 

                    [facility]      Next Of Kin         [place]

[place]  [address]                    [phone]

 

                    [facility] [place]  [address]                    [phone]

 

                    DAVID MARX         Next Of Derek         [address]

[place]  [address]                     [phone]

 

                    David Marx         ECON                [address]

[place]  [address]                     Unavailable







Care Team Providers





                    Care Team Member Name Role                Phone

 

                    TABBY Olivares Unavailable         Unavailable

 

                    MolluraTABBY PA Unavailable         Unavailable

 

                    Mollura, E Natasha PA Unavailable         Unavailable

 

                    Mollura, TABBY Kaur PA Unavailable         Unavailable

 

                    Mollura, E Natasha PA Unavailable         Unavailable

 

                    Mollura, TABBY Kaur PA Unavailable         Unavailable

 

                    Mollura, TABBY Kaur PA Unavailable         Unavailable

 

                    Mollura, E Natasha PA Unavailable         Unavailable

 

                    Mollura, E Natasha PA Unavailable         Unavailable

 

                    Mollura, E Natasha PA Unavailable         Unavailable

 

                    Mollura, E Natasha PA Unavailable         Unavailable

 

                    Mollura, E Natasha PA Unavailable         Unavailable

 

                    Mollura, E Natasha PA Unavailable         Unavailable

 

                    Mollura E Natasha PA Unavailable         Unavailable

 

                    Mollura E Natasha PA Unavailable         Unavailable

 

                    Mollura, E Natasha PA Unavailable         Unavailable

 

                    Mollura, E Natasha PA Unavailable         Unavailable

 

                    Mollura, E Natasha PA Unavailable         Unavailable

 

                    Mollura, E Natasha PA Unavailable         Unavailable

 

                    Mollura, E Natasha PA Unavailable         Unavailable

 

                    Mollura, E Natasha PA Unavailable         Unavailable

 

                    Mollura, E Natasha PA Unavailable         Unavailable

 

                    Mollura, E Natasha PA Unavailable         Unavailable

 

                    Mollura, E Natasha PA Unavailable         Unavailable

 

                    Mollura, E Natasha PA Unavailable         Unavailable

 

                    Mollura, E Natasha PA Unavailable         Unavailable

 

                    Mollura, E Natasha PA Unavailable         Unavailable

 

                    Mollura, E Natasha PA Unavailable         Unavailable

 

                    Mollura, E Natasha PA Unavailable         Unavailable

 

                    Mollura, E Natasha PA Unavailable         Unavailable

 

                    Mollura, E Natasha PA Unavailable         Unavailable

 

                    Mollura, E Natasha PA Unavailable         Unavailable

 

                    Mollura, E Natasha PA Unavailable         Unavailable

 

                    Mollura, E Natasha PA Unavailable         Unavailable

 

                    Mollura, E Natasha PA Unavailable         Unavailable

 

                    Mollura, E Natasha PA Unavailable         Unavailable

 

                    Nicole Nath MD  Unavailable         Unavailable

 

                    Nicole Nath MD  Unavailable         Unavailable

 

                    Nicole Nath MD  Unavailable         Unavailable

 

                    Nicole Nath MD  Unavailable         Unavailable

 

                    Nicole Nath MD  Unavailable         Unavailable

 

                    Nicole Nath MD  Unavailable         Unavailable

 

                    Nicole Nath MD  Unavailable         Unavailable

 

                    Nicole Nath MD  Unavailable         Unavailable

 

                    Nicole Nath MD  Unavailable         Unavailable

 

                    Nicole Nath MD  Unavailable         Unavailable

 

                    Nicole Nath MD  Unavailable         Unavailable

 

                    Nicole Nath MD  Unavailable         Unavailable

 

                    Nicole Nath MD  Unavailable         Unavailable

 

                    Nicole Nath MD  Unavailable         Unavailable

 

                    Nicole Nath MD  Unavailable         Unavailable

 

                    Nicole Nath MD  Unavailable         Unavailable

 

                    Nicole Nath MD  Unavailable         Unavailable

 

                    Nicole Nath MD  Unavailable         Unavailable

 

                    Nicole Nath MD  Unavailable         Unavailable

 

                    Nicole Nath MD  Unavailable         Unavailable

 

                    Nicole Nath MD  Unavailable         Unavailable

 

                    Nicole Nath MD  Unavailable         Unavailable

 

                    Nicole Nath MD  Unavailable         Unavailable

 

                    Nicole Nath MD  Unavailable         Unavailable

 

                    Braun, M Daisy PA-C Unavailable         Unavailable

 

                    Braun, M Daisy PA-C Unavailable         Unavailable

 

                    Braun, M Daisy PA-C Unavailable         Unavailable

 

                    Braun, M Daisy PA-C Unavailable         Unavailable

 

                    Braun, M Daisy PA-C Unavailable         Unavailable

 

                    Braun, M Daisy PA-C Unavailable         Unavailable

 

                    Braun, M Daisy PA-C Unavailable         Unavailable

 

                    Braun, M Daisy PA-C Unavailable         Unavailable

 

                    Braun, M Daisy PA-C Unavailable         Unavailable

 

                    Braun, M Daisy PA-C Unavailable         Unavailable

 

                    Braun, M Daisy PA-C Unavailable         Unavailable

 

                    Braun, M Daisy PA-C Unavailable         Unavailable

 

                    Braun, M Daisy PA-C Unavailable         Unavailable

 

                    Braun, M Daisy PA-C Unavailable         Unavailable

 

                    Braun, M Daisy PA-C Unavailable         Unavailable

 

                    Braun, M Daisy PA-C Unavailable         Unavailable

 

                    Braun, M Daisy PA-C Unavailable         Unavailable

 

                    Braun, M Daisy PA-C Unavailable         Unavailable

 

                    Braun, M Daisy PA-C Unavailable         Unavailable

 

                    Braun, M Daisy PA-C Unavailable         Unavailable

 

                    Braun, M Daisy PA-C Unavailable         Unavailable

 

                    Braun, M Daisy PA-C Unavailable         Unavailable

 

                    Braun, M Daisy PA-C Unavailable         Unavailable

 

                    Braun, M Daisy PA-C Unavailable         Unavailable

 

                    Braun, M Daisy PA-C Unavailable         Unavailable

 

                    Braun, M Daisy PA-C Unavailable         Unavailable

 

                    Braun, M Daisy PA-C Unavailable         Unavailable

 

                    Braun, M Daisy PA-C Unavailable         Unavailable

 

                    Braun, M Daisy PA-C Unavailable         Unavailable

 

                    Braun, M Daisy PA-C Unavailable         Unavailable

 

                    Braun, M Daisy PA-C Unavailable         Unavailable

 

                    Braun, M Daisy PA-C Unavailable         Unavailable



                                  



Re-disclosure Warning

          The records that you are about to access may contain information from 
federally-assisted alcohol or drug abuse programs. If such information is 
present, then the following federally mandated warning applies: This information
has been disclosed to you from records protected by federal confidentiality 
rules (42 CFR part 2). The federal rules prohibit you from making any further 
disclosure of this information unless further disclosure is expressly permitted 
by the written consent of the person to whom it pertains or as otherwise 
permitted by 42 CFR part 2. A general authorization for the release of medical 
or other information is NOT sufficient for this purpose. The Federal rules 
restrict any use of the information to criminally investigate or prosecute any 
alcohol or drug abuse patient.The records that you are about to access may 
contain highly sensitive health information, the redisclosure of which is 
protected by Article 27-F of the [facility] Public Health law. If you 
continue you may have access to information: Regarding HIV / AIDS; Provided by 
facilities licensed or operated by the [facility] Office of Mental Health; 
or Provided by the [facility] Office for People With Developmental 
Disabilities. If such information is present, then the following [place] 
mandated warning applies: This information has been disclosed to you from 
confidential records which are protected by state law. State law prohibits you 
from making any further disclosure of this information without the specific 
written consent of the person to whom it pertains, or as otherwise permitted by 
law. Any unauthorized further disclosure in violation of state law may result in
a fine or detention sentence or both. A general authorization for the release of 
medical or other information is NOT sufficient authorization for further disc
losure.                                                                         
    



Family History

          



             Family Member Name Family Member Gender Family Member Status Date o

f Status 

Description                             Data Source(s)

 

           Unknown    Male       Problem                          [facility] ([facility]

 

           Unknown    Male       Problem                          [facility] ([facility])

 

           Unknown    Unknown    Problem                          [facility] ([facility]

 

           Unknown    Female     Problem                          [facility] ([facility]



                                                                                
                                     



Encounters

          



           Encounter  Providers  Location   Date       Indications Data Source(s

)

 

                Outpatient                      [address]

[place] [address] 2020 12:00:00 AM

EST                                                 eCW1 ([facility])

 

                Outpatient      Attender: Daisy ROMANO-CConsultant: Natasha ROMANO                 2020

08:53:00 AM EST - 2020 08:53:00 AM EST                           [facility]

 

                Outpatient      Attender: Nicole Green/Aracelis/Juan Daniel/Blayne

ndl 2020 

02:30:00 PM EST                                     [facility] ([facility]

actice, )

 

                Outpatient      Attender: Daisy MINAYACConsultant: Natasha ROMANO                 08/10/2020

08:40:00 AM EDT - 08/10/2020 08:40:00 AM EDT                           [facility]

 

                Outpatient      Attender: Nicole Green/Aracelis/Juan Daniel/Blayne

ndl 2020 

01:00:00 PM EDT                                     [facility] ([facility]

actice, )



                                                                                
                                               



Medications

          



          Medication Brand Name Start Date Product Form Dose      Route     Admi

nistrative 

Instructions Pharmacy Instructions Status     Indications Reaction   Description

 Data 

Source(s)

 

                                        Sulfamethoxazole 800 MG / Trimethoprim 1

60 MG Oral Tablet Sulfamethoxazole-

Trimethoprim 800-160 MG   Sulfamethoxazole-Trimethoprim 800-160 MG 2020 

12:00:00 AM EST                                    active               Sulfamet

hoxazole-Trimethoprim 800-160 MG 

eCW1 ([facility])

 

           Famotidine 20 MG Oral Tablet [Pepcid] Pepcid     2020 12:00:00 

AM EST                       ORAL

                              active                                  [facility] ([facility])

 

        Prednisone 10 MG Oral Tablet Prednisone 2020 12:00:00 AM EST      

           ORAL                    

completed                                                       [facility] ([facility], )

 

                    Cholecalciferol 42382 UNT Oral Tablet Vitamin D3 Ultra Poten

cy 2020 

12:00:00 AM EDT               ORAL                 active                      M

EDENT ([facility])

 

        Stiolto Respimat Stiolto Respimat 2020 12:00:00 AM EDT            

     RESPIRATORY          

             completed                                           MED[facility] ([facility], )

 

                    formoterol fumarate 0.01 MG/ML Inhalant Solution [Perforomis

t] Perforomist         

2020 12:00:00 AM EDT                                    completed         

             [facility] ([facility], )

 

             Budesonide 0.5 MG/ML Inhalant Solution Budesonide   2020 12:0

0:00 AM EDT               

                                active                          [facility] ([facility])

 

                    arformoterol 0.0075 MG/ML Inhalant Solution [Brovana] Brovan

a             2020 

12:00:00 AM EDT                                    completed                    

  [facility] ([facility])

 

                                        60 ACTUAT Budesonide 0.16 MG/ACTUAT / fo

rmoterol fumarate 0.0045 MG/ACTUAT 

Metered Dose Inhaler [Symbicort] Symbicort    2020 12:00:00 AM EDT        

                   

RESPIRATORY                     active                                  [facility] (

[facility])

 

        Spiriva Respimat Spiriva Respimat 2020 12:00:00 AM EDT            

     RESPIRATORY          

             active                                              MED[facility] ([facility])



                                                                                
                                                                              



Insurance Providers

          



             Payer name   Policy type / Coverage type Policy ID    Covered party

 ID Covered 

party's relationship to mckeon Policy Mckeon             Plan Information

 

          [facility] FEDERAL [employer]PLOYEE PROGRAM           [id number]           SP        

          [id number]

 

          [facility] FEDERAL EMPLOYEE PROGRAM           [id number]           SP        

          [id number]

 

          [facility] FEDERAL EMPLOYEE PROGRAM           [id number]           SP        

          [id number]

 

          SELF PAY ONLY           [id number]           SP                  [id number]

[id number]

 

          [employer] F[employer] BS        [id number]           [id number]                  [id number]

[id number]

 

          [employer] [id number]           Self                [id number]

[id number]

 

          [facility] FEDERAL O/P           [id number]           18   

               [id number]

 

          [employer] [id number]           Self                [id number]

[id number]

 

          [employer]  C         [id number]           Self                [id number]

 

          [employer] [id number]           Self                [id number]

[id number]

 

          [facility] FEDERAL     -PHYS           [id number]          

 [id number]                  [id number]

 

          [employer] Federal Commercial [id number]           Self                [id number]

[id number]

 

          [employer] Federal Commercial [id number]           Self                [id number]

[id number]

 

          [employer] [id number]           Self                [id number]

[id number]

 

          [employer] [id number]           Self                [id number]

[id number]

 

          [employer] [id number]           Self                [id number]

[id number]

 

          [employer] Federal Plan Commercial [id number]           Self                [id number]

[id number]

 

          [employer] FEDERAL           [id number]           SP                

  [id number]

 

          [employer] FEDERAL           [id number]           SP                

  [id number]

 

          BS Federal Commercial                     Self                 

 

          [employer] [place] [employer] FEDERAL P         [id number]           S              

     [id number]

 

          [employer] [place] P         [id number]           [id number]                   

[id number]

 

                              [id number]                               [id number]



                                                                                
                                                                                
                                                                                
                                                                    



Problems, Conditions, and Diagnoses

          



           Code       Display Name Description Problem Type Effective Dates Data

 Source(s)

 

             B370         Candidal stomatitis Candidal stomatitis Diagnosis    1

2020 08:53:00 AM 

[facility]

 

                    K219                Gastro-esophageal reflux disease without

 esophagitis Gastro-esophageal 

reflux disease without esophagitis Diagnosis           2020 08:53:00 AM [facility]

 

                    J449                Chronic obstructive pulmonary disease, u

nspecified Chronic obstructive 

pulmonary disease, unspecified Diagnosis           2020 08:53:00 AM [facility]

 

                                   Obstructive sleep apnea (adult) (pediatr

ic) Obstructive sleep apnea 

(adult) (pediatric) Diagnosis           2020 08:53:00 AM [facility]

 

                                   Encounter for general adult medical exam

ination with abnormal findings 

Encounter for general adult medical examination with abnormal findings Diagnosis

                          08/10/2020 08:40:00 AM [facility]

 

             R233         Spontaneous ecchymoses Spontaneous ecchymoses Diagnosi

s    08/10/2020 08:40:00

 AM EDT                                 [facility]

 

             R140         Abdominal distension (gaseous) Abdominal distension (g

aseous) Diagnosis    

08/10/2020 08:40:00 AM T              [facility]



                                                                                
                                                                              



Surgeries/Procedures

          



             Procedure    Description  Date         Indications  Data Source(s)

 

             Spirometry                2020 12:00:00 AM SAIDA LOCKHART ([facility], 

)

 

             Spirometry                2020 12:00:00 AM EDT              ALEAH LOCKHART ([facility], 

)



                                                                                
                  



Results

          



                    ID                  Date                Data Source

 

                    [id number]         2020 06:37:00 PM EST [facility] ([facility]









          Name      Value     Range     Interpretation Code Description Data Amanda

rce(s) Supporting 

Document(s)

 

           Glucose, Fasting 98 mg/dL        Normal (applies to non-numeric

 results)            

[facility] ([facility])  

 

             Creatinine For GFR 0.76 mg/dL   0.70-1.30    Normal (applies to non

-numeric results) 

                          [facility] ([facility])  

 

           Blood Urea Nitrogen 8 mg/dL    7-18       Normal (applies to non-nume

jenae results)            

[facility] ([facility])  

 

           Potassium Serum 4.4 meq/L  3.5-5.1    Normal (applies to non-numeric 

results)            

[facility] ([facility])  

 

                Glomerular Filtration Rate Laboratory test result               

  Normal (applies to non-

numeric results)                        [facility] ([facility]) 

 

 

                                        <content>Units are mL/min/1.73 

m2</content><br/><content></content><br/><content>Chronic Kidney Disease Staging
 per NKF:</content><br/><content></content><br/><content>Stage I & II   GFR >=60
       Normal to Mildly Decreased</content><br/><content>Stage III      GFR 30-
59      Moderately Decreased</content><br/><content>Stage IV       GFR 15-29    
  Severely Decreased</content><br/><content>Stage V        GFR <15        Very 
Little GFR Left</content><br/><content>ESRD           GFR <15 on 
RRT</content><br/><content></content> 

 

           Sodium Level 139 meq/L  136-145    Normal (applies to non-numeric res

ults)            [facility] 

([facility])         

 

           Anion Gap  4 meq/L    8-16       Below low normal            [facility] (

[facility])                                 

 

           Carbon Dioxide Level 33 meq/L   21-32      Above high normal         

   [facility] ([facility])                        

 

           Chloride Level 102 meq/L       Normal (applies to non-numeric r

esults)            [facility]

 ([facility])        

 

           Calcium Level 9.3 mg/dL  8.5-10.1   Normal (applies to non-numeric re

sults)            

[facility] ([facility])  









                    ID                  Date                Data Source

 

                    [id number]         2020 06:37:00 PM EST [facility] ([facility])









          Name      Value     Range     Interpretation Code Description Data Amanda

rce(s) Supporting 

Document(s)

 

           Alt/SGPT   22 U/L     12-78      Normal (applies to non-numeric resul

ts)            [facility] ([facility])                  

 

           Ast/Sgot   20 U/L     7-37       Normal (applies to non-numeric resul

ts)            [facility] ([facility])                   

 

           Bilirubin,Direct 0.2 mg/dL  0.0-0.2    Normal (applies to non-numeric

 results)            

[facility] ([facility])  

 

           Bilirubin,Total 0.5 mg/dL  0.2-1.0    Normal (applies to non-numeric 

results)            

[facility] ([facility])  

 

           Alkaline Phosphatase 121 U/L         Above high normal         

   [facility]                        

 

           Albumin/Globulin Ratio 1.0                   Normal (applies to non-n

umeric results)            [facility] 

([facility])         

 

           Total Protein 7.5 GM/DL  6.4-8.2    Normal (applies to non-numeric re

sults)            [facility]        

 

           Albumin    3.7 GM/DL  3.2-5.2    Normal (applies to non-numeric resul

ts)            [facility] 

([facility])         









                    ID                  Date                Data Source

 

                    [id number]         2020 06:37:00 PM EST [facility] ([facility])









          Name      Value     Range     Interpretation Code Description Data Amanda

e(s) Supporting 

Document(s)

 

                CPK Creatine Phosphokinase 109 U/L                   [place]

l (applies to non-numeric 

results)                                [facility] ([facility]) 

 

 

           CK-MB Value Mass 2.5 ng/mL             Normal (applies to non-numeric

 results)            [facility] 

([facility])         

 

           MB/CK Relative Index 2.29                  Normal (applies to non-num

jeff results)            [facility])         

 

                                        <content>DIAGNOSIS CRITERIA</content><br

/><content>MMB ng/ml       Relative 

Index (RI)</content><br/><content>NON-AMI               < or = 5               
N/A</content><br/><content>GRAY ZONE              > 5                < or = 
4</content><br/><content>AMI                    > 5                   > 
4</content><br/><content></content> 

 

           Troponin I Laboratory test result            Normal (applies to non-n

umeric results)            

[facility])  

 

                                        <content>Troponin I Reference Interval f

or Siem[facility] [place] 

LOCI:</content><br/><content></content><br/><content>99th Percentile= 0.00-0.045
 ng/ml</content><br/><content></content><br/><content>Risk 
Stratification:</content><br/><content><= 0.10 ng/ml   Decreased Risk for 
Adverse Clinical</content><br/><content>Events.</content><br/><content>0.10-1.50
 ng/ml   Increased Risk for Adverse Clinical</content><br/><content>Events. 
Evaluation of additional</content><br/><content>criterion and/or repeat testing 
in 2-6</content><br/><content>hours is suggested to rule out 
myocardial</content><br/><content>damage.</content><br/><content>>= 1.50 ng/ml  
 Indicative of Myocardial Injury.</content><br/><content></content> 









                    ID                  Date                Data Source

 

                    [id number]         2020 06:37:00 PM EST [facility] ([facility])









          Name      Value     Range     Interpretation Code Description Data Amanda

rce(s) Supporting 

Document(s)

 

           Hemoglobin 15.5 g/dL  13.5-17.5  Normal (applies to non-numeric resul

ts)            [facility]         

 

           Red Blood Count 4.79 10    4.30-6.10  Normal (applies to non-numeric 

results)            

[facility]  

 

           White Blood Count 7.8 10     4.0-10.0   Normal (applies to non-numeri

c results)            

[facility] ([facility])  

 

                Mean Corpuscular Hemoglobin 32.4 pg         27.0-33.0       Norm

al (applies to non-numeric 

results)                                [facility] 

 

 

           Hematocrit 48.0 %     42.0-52.0  Normal (applies to non-numeric resul

ts)            [facility] 

([facility])         

 

           Mean Corpuscular Volume 100.2 fl   80.0-96.0  Above high normal      

      [facility] ([facility])                   

 

                Red Cell Distribution Width 12.9 %          11.5-14.5       Norm

al (applies to non-numeric 

results)                                [facility] ([facility]) 

 

 

                Mean Corpuscular HGB Conc 32.3 g/dL       32.0-36.5       Normal

 (applies to non-numeric 

results)                                [facility] ([facility]) 

 

 

                Platelet Count, Automated 330 10          150-450         Normal

 (applies to non-numeric results)

                                        [facility] ([facility]) 

 

 

           Lymph %    19.1 %     24.0-44.0  Below low normal            [facility] (

[facility])                                 

 

           Neutrophils % 69.4 %     36.0-66.0  Above high normal            [facility]

[facility] ([facility])                        

 

           Mono %     10.2 %     0.0-5.0    Above high normal            [facility] 

([facility])                                 

 

           Eos %      0.1 %      0.0-3.0    Normal (applies to non-numeric resul

ts)            [facility] ([facility])                   

 

           Baso %     0.8 %      0.0-1.0    Normal (applies to non-numeric resul

ts)            [facility] ([facility])                   

 

           Nucleated Red Blood Cell % 0.0 %      0-0        Normal (applies to n

on-numeric results)            

[facility] ([facility])  

 

           Neutrophils # 5.4 10     1.5-8.5    Normal (applies to non-numeric re

sults)            [facility] 

([facility])         

 

           Immature Granulocyte % 0.4 %      0-3.0      Normal (applies to non-n

umeric results)            

[facility] ([facility])  

 

           Eos #      0.0 10     0.0-0.5    Normal (applies to non-numeric resul

ts)            [facility] ([facility])                   

 

           Mono #     0.8 10     0.0-0.8    Normal (applies to non-numeric resul

ts)            [facility] ([facility])                  

 

           Lymph #    1.5 10     1.5-5.0    Normal (applies to non-numeric resul

ts)            [facility] 

([facility])         

 

           Baso #     0.1 10     0.0-0.2    Normal (applies to non-numeric resul

ts)            [facility] ([facility])                  









                    ID                  Date                Data Source

 

                    URINE CULTURE       2020 12:00:00 AM EST eCW1 ([facility])









          Name      Value     Range     Interpretation Code Description Data Amanda

rce(s) Supporting 

Document(s)

 

                                                  URINE CULTURE eCW1 ([facility])  









                    ID                  Date                Data Source

 

                    UA URINALYSIS       2020 12:00:00 AM EST eCW1 ([facility])









          Name      Value     Range     Interpretation Code Description Data Amanda

rce(s) Supporting 

Document(s)

 

                                                  UA URINALYSIS eCW1 ([facility])  









                    ID                  Date                Data Source

 

                    [id number]         12/10/2020 12:40:00 PM EST [facility] ([facility])









          Name      Value     Range     Interpretation Code Description Data Amanda

rce(s) Supporting 

Document(s)

 

           Laboratory test finding (navigational concept) Laboratory test result

                                  

[facility] ([facility])  

 

                                        ----------------------------------------

--------------------

Test: COVID-19 Nasal/Naspharynx

Result: NOT DETECTED           Reference Units: Not detected

                                        ----------------------------------------

--------------------

Note: Please consider re-collection of a new specimen, if

clinically indicated.



Note: The COVID-19 assay is under Emergency Use

Authorization(EUA) by the U.S. Food and Drug Administration.



[facility] is designated as a high complexity

laboratory by the Clinical Laboratory Improvement Amendments

of 1988(CLIA) and is qualified to perform this test.

ASSAY INFORMATION: Real Time RT-PCR



 









                    ID                  Date                Data Source

 

                    [id number]           12/10/2020 12:00:00 AM EST [facility]









          Name      Value     Range     Interpretation Code Description Data Amanda

rce(s) Supporting 

Document(s)

 

          2019-nCoV RNA XXX ELIZABETH+probe-Imp                                       

  [facility]     

 

                                        This lab was ordered by [facility] and reported by 

[employer] 









                    ID                  Date                Data Source

 

                    [id number]            08/10/2020 11:11:00 AM EDT [facility] ([facility])









          Name      Value     Range     Interpretation Code Description Data Amanda

rce(s) Supporting 

Document(s)

 

           White Blood Count Laboratory test result 4.3-10.9                    

     [facility] ([facility])                       

 

          Hemoglobin 16.6      14.0-16.0                     [facility] ([facility])  

 

           Red Blood Count Laboratory test result 4.70-6.20                     

   [facility] ([facility])                       

 

          Platelets 279       150-450                       [facility] ([facility])  

 

          Hematocrit 49.7      41.0-51.0                     [facility] ([facility])  









                    ID                  Date                Data Source

 

                    [id number]            08/10/2020 11:11:00 AM EDT [facility] ([facility])









          Name      Value     Range     Interpretation Code Description Data Amanda

rce(s) Supporting 

Document(s)

 

           Albumin [Mass/volume] in Serum or Plasma 4.7                         

                [facility] ([facility])                       

 

             Alanine aminotransferase [Enzymatic activity/volume] in Serum or Pl

asma 25                                     

                          [facility] ([facility])  

 

           Calcium [Mass/volume] in Serum or Plasma 9.5                         

                [facility] ([facility])                       

 

           Carbon dioxide, total [Moles/volume] in Serum or Plasma 28           

                               [facility] 

([facility])           

 

           Alkaline phosphatase [Enzymatic activity/volume] in Serum or Plasma 1

17                                         

[facility] ([facility])    

 

           Chloride [Moles/volume] in Serum or Plasma 100                       

                  [facility] ([facility])                       

 

           Protein [Mass/volume] in Serum or Plasma 7.3                         

                [facility] ([facility])                       

 

           Potassium [Moles/volume] in Serum or Plasma 4.5                      

                   [facility] ([facility])                       

 

          Sodium    141                                     [facility] ([facility])  

 

           Urea nitrogen [Mass/volume] in Serum or Plasma 14                    

                      [facility] ([facility])                       

 

                Aspartate aminotransferase [Enzymatic activity/volume] in Serum 

or Plasma 27                              

                                        [facility] ([facility])  

 

          Glucose   97                                [facility] ([facility])  

 

          Creatinine For GFR 0.8                                     [facility] ([facility]

[facility])  









                    ID                  Date                Data Source

 

                    [id number]            08/10/2020 11:11:00 AM EDT [facility] ([facility])









          Name      Value     Range     Interpretation Code Description Data Amanda

rce(s) Supporting 

Document(s)

 

          Thyroid Stimulating Hormone 0.89                                    ME

DENT ([facility])  









                    ID                  Date                Data Source

 

                    [id number]            08/10/2020 11:11:00 AM EDT [facility] ([facility])









          Name      Value     Range     Interpretation Code Description Data Amanda

rce(s) Supporting 

Document(s)

 

           Hemoglobin A1c/Hemoglobin.total in Blood 6.0                         

                [facility] ([facility] 

[facility])                       









                    ID                  Date                Data Source

 

                    [id number]         08/10/2020 09:36:00 AM EDT [facility] ([facility])









          Name      Value     Range     Interpretation Code Description Data Amanda

rce(s) Supporting 

Document(s)

 

           Calcidiol [Mass/volume] in Serum or Plasma 23 ng/mL                  

                  [facility] ([facility])                        

 

                                        <content>VITAMIN-D(25HYDROXY)</content><

br/><content>Deficiency: <=20 

ng/ml</content><br/><content>Insufficiency: 21-29 
ng/ml</content><br/><content>Preferred level: => 30 ng/ml</content><br/><conten
t></content> 

 

           Thyrotropin [Units/volume] in Serum or Plasma 0.89 uIU/mL 0.47-5.01  

                      [facility] 

([facility])         

 

             Cobalamin (Vitamin B12) [Mass/volume] in Serum or Plasma 1084 pg/mL

   [id number]                   

                          [facility] ([facility])  









                    ID                  Date                Data Source

 

                    [id number]         08/10/2020 09:36:00 AM EDT [facility] ([facility])









          Name      Value     Range     Interpretation Code Description Data Amanda

rce(s) Supporting 

Document(s)

 

          Triglycerides 88 mg/dL                          [facility] ([facility])  

 

           Cholesterol 202 mg/dL  131-200    Above high normal            [facility]

 ([facility])                                

 

          Cve Panel Laboratory test result                               [facility] 

([facility])  

 

                                        LIPID PANEL

 

 

          HDL       69 mg/dL  29-86                         [facility] ([facility])  

 

          LDL       117 mg/dL                         [facility] ([facility])  

 

           Risk Factor 2.9        3.4-4.9    Below low normal            [facility] 

([facility])                                 

 

          LDL/HDL   1.70      1.00-3.55                     [facility] ([facility])  

 

                                        CVE

RISK           CHOL/HDL       LDL/HDL

MEN:

                                        1/2  AVERAGE          3.43          1.00

AVERAGE          4.97          3.55

                                        2X   AVERAGE          9.55          6.25

                                        3X   AVERAGE         23.99          7.99

WOMEN:

                                        1/2  AVERAGE          3.27          1.47

AVERAGE          4.44          3.22

                                        2X   AVERAGE          7.05          5.03

                                        3X   AVERAGE         11.04          6.14

 









                    ID                  Date                Data Source

 

                    [id number]         08/10/2020 09:36:00 AM EDT [facility] ([facility])









          Name      Value     Range     Interpretation Code Description Data Amanda

rce(s) Supporting 

Document(s)

 

           Comprehensive Metabo Laboratory test result                          

        [facility] ([facility])                                

 

                                        COMPREHENSIVE METABOLIC PANEL

 

 

          Chloride  100 meq/L                         [facility] ([facility])  

 

          Potassium 4.5 meq/L 3.6-5.0                       [facility] ([facility])  

 

          Sodium    141 meq/L 134-153                       [facility] ([facility])  

 

          Co2       28 meq/L  22-30                         [facility] ([facility])  

 

          BUN       11 mg/dL  7-21                          [facility] ([facility])  

 

          Glucose   97 mg/dL                          [facility] ([facility])  

 

          Creatinine 0.8 mg/dL 0.7-1.5                       [facility] ([facility])  

 

          BUN/Creat 14        8-27                          [facility] ([facility])  

 

          Total Protein 7.3 g/dL  6.3-8.2                       [facility] ([facility])  

 

          A/G Ratio 1.8       0.8-2.0                       [facility] ([facility])  

 

          Globulin  2.6 GM/DL 2.4-3.2                       [facility] ([facility])  

 

          Albumin   4.7 g/dL  3.9-5.0                       [facility] ([facility])  

 

          Calcium   9.5 mg/dL 8.4-10.2                      [facility] ([facility])  

 

          Alkaline Phos 117 U/L                           [facility] ([facility])  

 

          Total Bili 0.7 mg/dL 0.2-1.3                       [facility] ([facility])  

 

          Anion Gap 13.0 mmol/L 8.0-16.0                      [facility] ([facility])  

 

          Sgot/Ast  27 U/L    5-40                          [facility] ([facility])  

 

          SGPT/Alt  25 U/L    7-56                          [facility] ([facility])  

 

           Afr Amer GFR Laboratory test result                                  

[facility] ([facility])                                 

 

                                        Male GFR Interprentation

                                        20-49 yrs     >60 mL/min   Normal

                                        50-59 yrs     >56 mL/min   Normal

                                        60-69 yrs     >49 mL/min   Normal

                                        70-79yrs      >42 mL/min   Normal

                                        80 and above  >35 mL/min   Normal

Female GFR  Interpretation

                                        20-39 yrs     >60 mL/min   Normal

                                        40-49 yrs     >58 mL/min   Normal

                                        50-59 yrs     >51 mL/min   Normal

                                        60-69 yrs     >45 mL/min   Normal

                                        70-79 yrs     >39 mL/min   Normal

                                        80 and above  >32 mL/min   Normal

 

 

          Age       60 yrs                                  [facility] ([facility])  

 

          Non-Aa GFR Laboratory test result                               [facility]

 ([facility]) 

 









                    ID                  Date                Data Source

 

                    [id number]         08/10/2020 09:36:00 AM EDT [facility] ([facility])









          Name      Value     Range     Interpretation Code Description Data Amanda

rce(s) Supporting 

Document(s)

 

           CBC W/Automated Diff Laboratory test result                          

        [facility] ([facility])                                

 

                                        COMPLETE BLOOD COUNT

 

 

          WBC       6.7 10^3/uL 4.2-11.0                      [facility] ([facility])  

 

          Hematocrit 49.7 %    41.0-51.0                     [facility] ([facility])  

 

          RBC       5.00 10^6/uL 4.50-6.30                     [facility] ([facility])  

 

           Hemoglobin 16.6 g/dL  14.0-16.0  Above high normal            [facility] 

([facility])                        

 

          MCH       33.2 pg   27.0-34.0                     [facility] ([facility])  

 

          MCHC      33.4 g/dL 31.0-36.0                     [facility] ([facility])  

 

           MCV        99.4 fL    80.0-94.0  Above high normal            [facility] 

([facility])                                 

 

          RDW       13.3 %    11.5-14.8                     [facility] ([facility])  

 

          Platelets 279 10^3/uL 150-450                       [facility] ([facility])  

 

          MPV       8.8 fL    7.4-10.4                      [facility] ([facility])  

 

           Lymph      18.4 %     25.0-40.0  Below low normal            [facility] (

[facility])                                 

 

          Neut      69.6 %    37.0-80.0                     [facility] ([facility])  

 

          Eos       2.1 %     0.0-7.0                       [facility] ([facility])  

 

          Mono      8.8 %     3.0-8.0   Above high normal           [facility] ([facility])  

 

          Baso      1.0 %     0.0-2.0                       [facility] ([facility])  

 

          %Ig       0.1 %     0.0-0.0   Above high normal           [facility] ([facility])  

 

          %NRBC     0.0 %     0.0-0.0                       [facility] ([facility])  

 

          #Neut     4.64 10^3/uL 2.00-6.90                     [facility] ([facility])  

 

          #Mono     0.59 10^3/uL 0.00-0.90                     [facility] ([facility])  

 

          #Eos      0.14 10^3/uL 0.00-0.70                     [facility] ([facility])  

 

          #Lymph    1.23 10^3/uL 0.60-3.40                     [facility] ([facility])  

 

          #Ig       0.01 10^3/uL 0.00-0.10                     [facility] ([facility])  

 

          #Baso     0.07 10^3/uL 0.00-0.20                     [facility] ([facility])  

 

          #NRBC     0.00 10^3/uL 0.00-0.00                     [facility] ([facility])  

 

           Manual Diff Laboratory test result                                  M

[facility] ([facility])

                                         

 

          %Lymph    18 %      25-40     Below low normal           [facility] ([facility])  

 

          Segs      71 %      37-80                         [facility] ([facility])  

 

          %Eos      1 %       0-7                           [facility] ([facility])  

 

          RBC Morph Laboratory test result                               [facility] 

([facility])  

 

          %Mono     10 %      3-8       Above high normal           [facility] ([facility])  









                    ID                  Date                Data Source

 

                    [id number]         08/10/2020 09:36:00 AM EDT [facility] ([facility])









          Name      Value     Range     Interpretation Code Description Data Amanad

rce(s) Supporting 

Document(s)

 

           Hemoglobin A1c/Hemoglobin.total in Blood 6.0 %      4.4-6.1          

                [facility] ([facility])                       

 

                                        {A1]

{HB]

 









                    ID                  Date                Data Source

 

                    [id number]         08/10/2020 09:36:00 AM EDT [facility] ([facility])









          Name      Value     Range     Interpretation Code Description Data Amanda

rce(s) Supporting 

Document(s)

 

           Lab - Specimen Rejec Laboratory test result                          

        [facility] ([facility])                                 

 

                                        Specimen Integrity/Specimen Recollection

The patient sample needs to be resubmitted for the following reason:

 

 

           Test(s) Ordered Laboratory test result                               

   [facility] ([facility])                                 

 

           Rejection Reason Laboratory test result                              

    [facility] ([facility])                                 

 

                                        {          One or more of the tests you 

ordered cannot be performed.

{          Please recollect, reorder, and resubmit if needed.

 









                    ID                  Date                Data Source

 

                    [id number]         08/10/2020 09:36:00 AM EDT [facility] ([facility])









          Name      Value     Range     Interpretation Code Description Data Amanda

rce(s) Supporting 

Document(s)

 

           Hemoglobin A1c/Hemoglobin.total in Blood Laboratory test result      

                            [facility] 

([facility])         

 

           Thyrotropin [Units/volume] in Serum or Plasma Laboratory test result 

                                 

[facility] ([facility])  

 

                    Cobalamin (Vitamin B12) [Mass/volume] in Serum or Plasma Lab

oratory test result 

                                                [facility] ([facility]

linics)  

 

           Calcidiol [Mass/volume] in Serum or Plasma Laboratory test result    

                              [facility] 

([facility])         

 

           Folate [Mass/volume] in Red Blood Cells Laboratory test result       

                           [facility] 

([facility])         









                    ID                  Date                Data Source

 

                    [id number]     08/10/2020 04:12:00 PM EDT [facility]









          Name      Value     Range     Interpretation Code Description Data Amanda

rce(s) Supporting 

Document(s)

 

                Cobalamin (Vitamin B12) [Mass/volume] in Serum or Plasma 1084 PG

/ML      232 - 1245       

                                        [facility]  









                    ID                  Date                Data Source

 

                    [id number]     08/10/2020 04:12:00 PM EDT [facility]









          Name      Value     Range     Interpretation Code Description Data Amanda

rce(s) Supporting 

Document(s)

 

           Calcidiol [Moles/volume] in Serum or Plasma 23 NG/ML                 

                   [facility]                                 

 

                                                                     VITAMIN-D(2

5HYDROXY)                          

Deficiency: <=20 ng/ml                          Insufficiency: 21-29 ng/ml      
                    Preferred level: => 30 ng/ml 









                    ID                  Date                Data Source

 

                    [id number]     08/10/2020 04:12:00 PM EDT [facility]









          Name      Value     Range     Interpretation Code Description Data Amanda

rce(s) Supporting 

Document(s)

 

                          Thyrotropin [Units/volume] in Serum or Plasma by Detec

tion limit <= 0.05 mIU/L 

0.89 uIU/mL  0.47 - 5.01                            [facility]  









                    ID                  Date                Data Source

 

                    [id number]     08/10/2020 03:52:00 PM EDT [facility]









          Name      Value     Range     Interpretation Code Description Data Amanda

rce(s) Supporting 

Document(s)

 

          CVE PANEL                                         [facility]

al  

 

                                            LIPID PANEL 

 

           Cholesterol [Mass/volume] in Serum or Plasma 202 MG/DL  131 - 200  H 

                    [facility]                            

 

           Deprecated Triglyceride [Mass/volume] in Serum or Plasma 88 MG/DL   3

5 - 160                         

[facility]                   

 

          HDL       69 MG/DL  29 - 86                       [facility]

al  

 

                    Cholesterol in LDL [Mass/volume] in Serum or Plasma by Direc

t assay 117 mg/dL           65

 - 175                                          [facility]  

 

                    Cholesterol.total/Cholesterol in HDL [Mass Ratio] in Serum o

r Plasma 2.9                 3.4 - 

4.9             L                               [facility]  

 

          LDL/HDL   1.70      1.00 - 3.55                     [facility]

ital  

 

                                        CVE         RISK           CHOL/HDL     

  LDL/HDLMEN:    1/2  AVERAGE          

3.43          1.00         AVERAGE          4.97          3.55    2X   AVERAGE  
       9.55          6.25    3X   AVERAGE         23.99          7.99WOMEN:    
1/2  AVERAGE          3.27          1.47         AVERAGE          4.44          
3.22    2X   AVERAGE          7.05          5.03    3X   AVERAGE         11.04  
       6.14 









                    ID                  Date                Data Source

 

                    [id number]     08/10/2020 03:46:00 PM EDT [facility]









          Name      Value     Range     Interpretation Code Description Data Amanda

rce(s) Supporting 

Document(s)

 

          COMPREHENSIVE METABOLIC PANEL                                         

[facility]  

 

                                            COMPREHENSIVE METABOLIC PANEL 

 

           Sodium [Moles/volume] in Serum or Plasma 141 mEq/L  134 - 153        

                [facility]                                 

 

           Potassium [Moles/volume] in Serum or Plasma 4.5 mEq/L  3.6 - 5.0     

                   [facility]                            

 

           Chloride [Moles/volume] in Serum or Plasma 100 mEq/L  98 - 107       

                  [facility]                                 

 

           Carbon dioxide, total [Moles/volume] in Serum or Plasma 28 MEQ/L   22

 - 30                          

[facility]                   

 

           Glucose [Mass/volume] in Serum or Plasma 97 MG/DL   65 - 110         

                [facility]                                 

 

          BUN       11 MG/DL  7 - 21                        [facility]

[facility]  

 

           Creatinine [Mass/volume] in Serum or Plasma 0.8 MG/DL  0.7 - 1.5     

                   [facility]                            

 

          BUN/CREAT 14        8 - 27                        [facility]

al  

 

           Protein [Mass/volume] in Serum or Plasma 7.3 G/DL   6.3 - 8.2        

                [facility]                                 

 

           Albumin [Mass/volume] in Serum or Plasma 4.7 G/DL   3.9 - 5.0        

                [facility]                                 

 

           Globulin [Mass/volume] in Serum by calculation 2.6 GM/DL  2.4 - 3.2  

                      [facility]                            

 

          A/G RATIO 1.8       0.8 - 2.0                     [facility]  

 

           Calcium [Mass/volume] in Serum or Plasma 9.5 MG/DL  8.4 - 10.2       

                [facility]                                 

 

           Bilirubin.total [Mass/volume] in Serum or Plasma 0.7 MG/DL  0.2 - 1.3

                        

[facility]                   

 

                    Alkaline phosphatase [Enzymatic activity/volume] in Serum or

 Plasma 117 U/L             38 -

126                                             [facility]  

 

                          Aspartate aminotransferase [Enzymatic activity/volume]

 in Serum or Plasma 27 U/L

             5 - 40                                 [facility]  

 

                    Alanine aminotransferase [Enzymatic activity/volume] in Seru

m or Plasma 25 U/L              7

- 56                                            [facility]  

 

           Anion gap 3 in Serum or Plasma 13.0 mmol/L 8.0 - 16.0                

       [facility]

                                         

 

          AGE       60 yrs                                  [facility]

al  

 

          NON-AA GFR >60 mL/min                               [facility]

ital  

 

          AFR AMER GFR >60 mL/min                               [facility]

spital  

 

                                                                     Male GFR In

terprentation                  20-49 yrs

    >60 mL/min   Normal                  50-59 yrs     >56 mL/min   Normal      
           60-69 yrs     >49 mL/min   Normal                  70-79yrs      >42 
mL/min   Normal                  80 and above  >35 mL/min   Normal              
     Female GFR  Interpretation                  20-39 yrs     >60 mL/min   
Normal                  40-49 yrs     >58 mL/min   Normal                  50-59
yrs     >51 mL/min   Normal                  60-69 yrs     >45 mL/min   Normal  
               70-79 yrs     >39 mL/min   Normal                  80 and above  
>32 mL/min   Normal 









                    ID                  Date                Data Source

 

                    [id number]     08/10/2020 01:48:00 PM EDT [facility]









          Name      Value     Range     Interpretation Code Description Data Amanda

rce(s) Supporting 

Document(s)

 

          CBC W/AUTOMATED DIFF                                         [facility]  

 

                                            COMPLETE BLOOD COUNT 

 

           Leukocytes [#/volume] in Blood by Automated count 6.7 10^3/uL 4.2 - 1

1.0                       

[facility]                   

 

             Erythrocytes [#/volume] in Blood by Automated count 5.00 10^6/uL 4.

50 - 6.30                

                          [facility]     

 

           Hemoglobin [Mass/volume] in Blood 16.6 g/dL  14.0 - 16.0 H           

          [facility]                                 

 

           Hematocrit [Volume Fraction] of Blood by Automated count 49.7 %     4

1.0 - 51.0                       

[facility]                   

 

                    Erythrocyte mean corpuscular volume [Entitic volume] by Auto

mated count 99.4 fL             

80.0 - 94.0     H                               [facility]  

 

                          Erythrocyte mean corpuscular hemoglobin [Entitic mass]

 by Automated count 33.2 

pg           27.0 - 34.0                            [facility]  

 

                                        Erythrocyte mean corpuscular hemoglobin 

concentration [Mass/volume] by Automated

 count     33.4 g/dL  31.0 - 36.0                       [facility]  

 

             Erythrocyte distribution width [Ratio] by Automated count 13.3 %   

    11.5 - 14.8                

                          [facility]     

 

           Platelets [#/volume] in Blood by Automated count 279 10^3/uL 150 - 45

0                        

[facility]                   

 

                    Platelet mean volume [Entitic volume] in Blood by Automated 

count 8.8 fL              7.4 - 

10.4                                            [facility]  

 

           Neutrophils/100 leukocytes in Blood by Automated count 69.6 %     37.

0 - 80.0                       

[facility]                   

 

           Lymphocytes/100 leukocytes in Blood by Manual count 18.4 %     25.0 -

 40.0 L                     

[facility]                   

 

           Monocytes/100 leukocytes in Blood by Automated count 8.8 %      3.0 -

 8.0  H                     

[facility]                   

 

           Eosinophils/100 leukocytes in Blood by Automated count 2.1 %      0.0

 - 7.0                        

[facility]                   

 

           Basophils/100 leukocytes in Blood by Automated count 1.0 %      0.0 -

 2.0                        

[facility]                   

 

          %IG       0.1 %     0.0 - 0.0 H                   [facility]

al  

 

          %NRBC     0.0 %     0.0 - 0.0                     [facility]

al  

 

           Neutrophils [#/volume] in Blood by Automated count 4.64 10^3/uL 2.00 

- 6.90                       

[facility]                   

 

           Lymphocytes [#/volume] in Blood by Automated count 1.23 10^3/uL 0.60 

- 3.40                       

[facility]                   

 

           Monocytes [#/volume] in Blood by Automated count 0.59 10^3/uL 0.00 - 

0.90                       

[facility]                   

 

           Eosinophils [#/volume] in Blood by Automated count 0.14 10^3/uL 0.00 

- 0.70                       

[facility]                   

 

           Basophils [#/volume] in Blood by Automated count 0.07 10^3/uL 0.00 - 

0.20                       

[facility]                   

 

          #IG       0.01 10^3/uL 0.00 - 0.10                     [facility]

ospital  

 

          #NRBC     0.00 10^3/uL 0.00 - 0.00                     [facility]

ospital  

 

          MANUAL DIFF SEE BELOW                               [facility]

ital  

 

           Segmented neutrophils/100 leukocytes in Blood by Manual count 71 %   

    37 - 80                          

[facility]                   

 

          %LYMPH    18 %      25 - 40   L                   [facility]

al  

 

          %MONO     10 %      3 - 8     H                   [facility]

al  

 

          %EOS      1 %       0 - 7                         [facility]

al  

 

          RBC MORPH MORPH IS NORMAL                               [facility]  









                    ID                  Date                Data Source

 

                    [id number]     08/10/2020 01:27:00 PM EDT [facility]









          Name      Value     Range     Interpretation Code Description Data Amanda

rce(s) Supporting 

Document(s)

 

           Hemoglobin A1c/Hemoglobin.total in Blood 6.0 %      4.4 - 6.1        

                [facility]                                 

 

                                        {A1]{HB] 









                    ID                  Date                Data Source

 

                    [id number]     08/10/2020 12:52:00 PM EDT [facility]









          Name      Value     Range     Interpretation Code Description Data Amanda

rce(s) Supporting 

Document(s)

 

          LAB - SPECIMEN REJECTION                                         [facility]  

 

                                        Specimen Integrity/Specimen Recollection

 The patient sample needs to be 

resubmitted for the following reason: 

 

          Test(s) Ordered FOLIC ACID RBC                               [facility]  

 

          Rejection Reason QNS                                     [facility]  

 

                                        {          One or more of the tests you 

ordered cannot be performed.{          

Please recollect, reorder, and resubmit if needed. 







                                        Procedure

 

                                          



                                                                                
                                                                                
                                                                                
                                                                                
                                  



Social History

          



           Code       Duration   Value      Status     Description Data Source(s

)

 

           Smoking    2020 12:00:00 AM EST Former Smoker completed  Former

 Smoker eCW1 

([facility])

 

                    Smoking             2020 12:00:00 AM EST - 2019 

12:00:00 AM EST Patient is a 

former smoker       completed           Patient is a former smoker [facility] ([facility])



                                                                                
                           



Vital Signs

          



                    ID                  Date                Data Source

 

                    UNK                                      









           Name       Value      Range      Interpretation Code Description Data

 Source(s)

 

           Body surface area Derived from formula 1.51 m2                       

   1.51 m2    [facility]

 

           Body weight 49.442 kg                        49.442 kg  [facility] ([facility])

 

           Ideal body [facility] 130 [[facility]av[facility]                       [facility] ([facility]

 

           Body mass index (BMI) [Ratio] 18.7 kg/m2                       18.7 k

g/m2 [facility] ([facility])

 

           Body weight 109.00 [lb_av]                       109.00 [lb_av] [facility]

T ([facility])

 

           Body height 64 [in_i]                        64 [in_i]  [facility] ([facility])

 

                                        5'4" 

 

           Diastolic blood pressure 68 mm[Hg]                        68 mm[Hg]  

[facility] ([facility] 

[facility], )

 

           Systolic blood pressure 128 mm[Hg]                       128 mm[Hg] M

[facility] ([facility] 

[facility], )

 

           Diastolic blood pressure 82 mm[Hg]                        82 mm[Hg]  

eCW1 ([facility])

 

           Systolic blood pressure 122 mm[Hg]                       122 mm[Hg] e

CW1 ([facility])

 

           Body temperature 98.2 [degF]                       98.2 [degF] eCW1 (

[facility])

 

           Respiratory rate 20 /min                          20 /min    eCW1 ([facility])

 

           Heart rate 86 /min                          86 /min    eCW1 ([facility])

 

           Body mass index (BMI) [Ratio] 0.13 kg/m2                       0.13 k

g/m2 W1 ([facility])

 

           Body height                                   [in_i]    eCW1 ([facility])

 

           Body weight 110 [lb_av]                       110 [lb_av] eCW1 ([facility])

 

           Body surface area Derived from formula 1.54 m2                       

   1.54 m2    [facility] ([facility])

 

           Body mass index (BMI) [Ratio] 19.4 kg/m2                       19.4 k

g/m2 [facility]

 

           Body height 64 [in_i]                        64 [in_i]  [facility] ([facility])

 

                                        5'4" 

 

           Body weight 51.370 kg                        51.370 kg  [facility] ([facility])

 

           Body weight 113.25 [lb_av]                       113.25 [lb_av] [facility]

T ([facility])

 

           Oxygen saturation in Arterial blood by Pulse oximetry 96 %           

                  96 %       [facility] 

[facility])

 

           Respiratory rate 16 /min                          16 /min    [facility] (

[facility])

 

           Body temperature 97.1 [degF]                       97.1 [degF] [facility]

 ([facility])

 

           Heart rate 100 /min                         100 /min   [facility] ([facility])

 

           Diastolic blood pressure 80 mm[Hg]                        80 mm[Hg]  

[facility] ([facility])

 

           Systolic blood pressure 122 mm[Hg]                       122 mm[Hg] M

[facility] ([facility])

 

           Body surface area Derived from formula 1.54 m2                       

   1.54 m2    [facility] ([facility])

 

           Body weight 51.370 kg                        51.370 kg  [facility] ([facility])

 

           Ideal body weight 130 [lb_av]                       130 [lb_av] [facility]

T ([facility])

 

           Body mass index (BMI) [Ratio] 19.4 kg/m2                       19.4 k

g/m2 [facility] ([facility])

 

           Body weight 113.25 [lb_av]                       113.25 [lb_av] [facility]

T ([facility])

 

           Body height 64 [in_i]                        64 [in_i]  [facility] ([facility])

 

                                        5'4" 

 

           Body temperature 99.1 [degF]                       99.1 [degF] [facility]

 ([facility])

 

           Oxygen saturation in Arterial blood by Pulse oximetry 93 %           

                  93 %       [facility] 

([facility])

 

           Heart rate 96 /min                          96 /min    [facility] ([facility])

 

           Diastolic blood pressure 74 mm[Hg]                        74 mm[Hg]  

[facility] ([facility])

 

           Systolic blood pressure 122 mm[Hg]                       122 mm[Hg] M

[facility]

 

           Body surface area Derived from formula 1.50 m2                       

   1.50 m2    [facility] ([facility])

 

           Body mass index (BMI) [Ratio] 18.3 kg/m2                       18.3 k

g/m2 [facility] ([facility])

 

           Body height 64 [in_i]                        64 [in_i]  [facility]

 

                                        5'4" 

 

           Body weight 48.252 kg                        48.252 kg  [facility] ([facility])

 

           Body weight 106.38 [lb_av]                       106.38 [lb_av] MEDEN

T ([facility])

 

           Oxygen saturation in Arterial blood by Pulse oximetry 95 %           

                  95 %       [facility] 

([facility]

 

           Respiratory rate 16 /min                          16 /min    [facility] (

[facility])

 

           Body temperature 97.2 [degF]                       97.2 [degF] MEDENT

 ([facility])

 

           Heart rate 90 /min                          90 /min    [facility] ([facility])

 

           Diastolic blood pressure 64 mm[Hg]                        64 mm[Hg]  

[facility] ([facility])

 

           Systolic blood pressure 112 mm[Hg]                       112 mm[Hg] M

[facility] ([facility])

 

           Body surface area 1.50 m2                          1.50 m2    [facility] 

([facility])

 

           Body weight 51.313 kg                        51.313 kg  [facility] ([facility])

 

           Body mass index (BMI) [Ratio] 19.4 kg/m2                       19.4 k

g/m2 [facility] ([facility], )

 

           Body weight 113.12 [lb_av]                       113[facility]12 [facility] ([facility]

 

           Body height 64 [in_i]                        64 [in_i]  [facility] ([facility])

 

                                        5'4" 

 

           Body temperature 98.3 [degF]                       98.3 [degF] [facility]

 ([facility])

 

           Oxygen saturation in Arterial blood by Pulse oximetry 93 %           

                  93 %       [facility] 

([facility])

 

           Heart rate 103 /min                         103 /min   [facility] ([facility])

 

           Diastolic blood pressure 90 mm[Hg]                        90 mm[Hg]  

[facility] ([facility])

 

           Systolic blood pressure 138 mm[Hg]                       138 mm[Hg] M

[facility] ([facility], )

 

           Body height 64 [in_i]                        64 [in_i]  [facility] ([facility])

 

                                        5'4" 



                                                                                
                  



Patient Treatment Plan of Care

          



             Planned Activity Planned Date Details      Description  Data Source

(s)

 

                          Sulfamethoxazole 800 MG / Trimethoprim 160 MG Oral Tab

let 2020 12:00:00 AM

 EST                                                        eCW1 ([facility])

## 2021-01-29 NOTE — CCD
Continuity of Care Document (CCD)

                             Created on: 2021



Neftaly Guillory

External Reference #: [id number]

: 1960

Sex: Male



Demographics





                          Address                   [address]

[place]  [address]

 

                          Home Phone                [phone]

 

                          Preferred Language        Unknown

 

                          Marital Status            Unknown

 

                          [employer] Affiliation     Unknown

 

                          Race                      White

 

                          Ethnic Group              Not  or 





Author





                          Author                    Neftaly ALANIZ M.D.

 

                          Organization              Unknown

 

                          Address                   [address]

[place]  [address]



 

                          Phone                     [phone]







Care Team Providers





                    Care Team Member Name Role                Phone

 

                    Valarie Marx M.D. AUTM                [phone]

 

                    Daisy Braun P.A.-C AUTM                [phone]







Problems





                                        Description

 

                                        No Information Available







Social History





                Type            Date            Description     Comments

 

                Birth Sex                       Unknown          

 

                ETOH Use                        Denies alcohol use  

 

                Tobacco Use     Start: 75 End: 20 Patient is a forme

r smoker hx: 1/2-

1ppd x 40 yrs 

 

                Smoking Status  Reviewed: 20 Patient is a former smoker hx

: 1/2-1ppd x 40 

yrs 







Allergies, Adverse Reactions, Alerts





                                        Description

 

                                        No Known Drug Allergies







Medications





           Active Medications SIG        Qnty       Indications Ordering Provide

r Date

 

                          Budesonide                     1mg/2ML Suspension     

              2 vial via 

nebulizer twice a day 720ml           G47.33          Nicole Nath M.D. 

 

                          Symbicort                     160-4.5mcg/Act Aerosol  

                 2 puff 

twice a day     30.6gm          J44.9           Nicole Nath M.D. 2020

 

                          Pantoprazole Sodium                     40mg Tablets D

R                   take 1

tablet by mouth every day 90tabs          K21.9           Nicole Nath M.D. 0

2019

 

             Pepcid                     40mg Tablets                   1tab po q

d                             Unknown

                                        

 

             Charcoal                     200mg Capsules                   1cap 

po bid                            

Unknown                                 

 

                          Nasal Spray 12 Hour                     0.05% Solution

                   use as 

directed                                        Unknown         

 

                          Super B Complex Maxi                      Tablets     

              1 tab by 

mouth every day                                 Unknown         

 

                          Aspirin 81                     81mg Tablets DR        

           take 1 tab by 

mouth daily                                     Unknown         

 

                          Vitamin D3                     25mcg (1000 Ut) Capsule

s                   1 tab 

by mouth every day                                 Unknown         

 

                          Tamsulosin HCL                     0.4mg Capsules     

              1 tab by 

mouth every day                                 Unknown         

 

                          Simvastatin                     20mg Tablets          

         1 by mouth every 

day                                             Unknown         

 

                          Clotrimazole                     10mg Zack          

         1 tab by mouth 

every 6 hours as needed for thrush 30units                         Ivan Nath M.D. 

 

                          Multi Vitamin Mens                      Tablets       

            1 by mouth 

every day                                       Unknown         

 

           CPAP       +5 Marras-----Non Compliant                       Unknown 

   

 

                    Acapella                      Misc                   use twi

ce a day with nebs  

                                        Unknown             

 

                          Mucinex Sinus-Max Day/Night                      Misc 

                  1 by 

mouth once a day                                 Unknown         

 

                          Xopenex HFA                     45mcg/Act Aerosol     

              2 puffs 

every 4 hours as needed                                 Unknown         

00

 

                                        Ipratropium Bromide/Albuterol Sulfate   

                  0.5-2.5(3)mg/3ML 

Solution                   use 1 vial via nebulizer four times daily as needed 

1080ml              J44.9               Nicole Nath M.D. 

 

                                        History Medications

 

                          Prednisone                     10mg Tablets           

        4 tabs by mouth 

daily for 3 days, then 3 tabs daily for 3 days, then 2 tabs daily for 3 days 
then 1 tab daily for 3 days 30tabs          J44.1           Nicole Nath M.D.

 2020 - 

2020

 

                          Stiolto Respimat                     2.5-2.5mcg/Act Ae

rosol                   2 

puffs once daily 12gm                            Nicole Nath M.D. 2020

 - 2020

 

                          Perforomist                     20mcg/2ML Nebulizer   

                1 vial via

neb twice a day (can mix with budesonide) 360ml           J44.9           Nicole Nath M.D. 

2020 - 2020

 

                          Brovana                     15mcg/2ML Nebulizer       

            1 vial 

nebulized twice a day 360ml                           Nicole Nath M.D.  - 2020







Immunizations





                                        Description

 

                                        No Information Available







Vital Signs





                Date            Vital           Result          Comment

 

                2021 10:27am BP Systolic     128 mmHg         

 

                    BP Diastolic        68 mmHg              

 

                    Height              64 inches           5'4"

 

                    Weight              109.00 lb            

 

                    BMI (Body Mass Index) 18.7 kg/m2           

 

                    Ideal Body Weight   130 lb               

 

                    Weight              49.442 kg            

 

                    BSA (Body Surface Area) 1.51 m2              

 

                2020  3:22pm BP Systolic     122 mmHg         

 

                    BP Diastolic        74 mmHg              

 

                    Heart Rate          96 /min              

 

                    O2 % BldC Oximetry  93 %                 

 

                    Body Temperature    99.1 F             

 

                    Height              64 inches           5'4"

 

                    Weight              113.25 lb            

 

                    BMI (Body Mass Index) 19.4 kg/m2           

 

                    Ideal Body Weight   130 lb               

 

                    Weight              51.370 kg            

 

                    BSA (Body Surface Area) 1.54 m2              







Results





                                        Description

 

                                        No Information Available







Procedures





                Date            Code            Description     Status

 

                2020      50227           Spirometry      Completed

 

                2020      22253           Spirometry      Completed







Medical Devices





                                        Description

 

                                        No Information Available







Encounters





           Type       Date       Location   Provider   Dx         Diagnosis

 

                Office Visit    2020  3:30p [facility] Nicole Evans M.D.

                          G47.33                    Obstructive sleep apnea (ulises

lt) (pediatric)

 

                          J30.9                     Allergic rhinitis, unspecifi

ed

 

                          K21.9                     Gastro-esophageal reflux dis

ease without esophagitis

 

                          J44.1                     Chronic obstructive pulmonar

y disease w (acute) exacerbation

 

                          Z87.891                   Personal history of nicotine

 dependence

 

                Office Visit    2020  1:00p [facility] Nicole Evans M.D.

                          J44.9                     Chronic obstructive pulmonar

y disease, unspecified

 

                          G47.33                    Obstructive sleep apnea (ulises

lt) (pediatric)

 

                          J30.9                     Allergic rhinitis, unspecifi

ed







Assessments





                Date            Code            Description     Provider

 

                2020      G47.33          Obstructive sleep apnea (adult) 

(pediatric) Nicole Nath M.D.

 

                2020      J30.9           Allergic rhinitis, unspecified Nicole Evans M.D.

 

                2020      K21.9           Gastro-esophageal reflux disease

 without esophagitis Nicole Nath M.D.

 

                    2020          J44.1               Chronic obstructive 

pulmonary disease with (acute) exacerbation

                                        Nicole Nath M.D.

 

                2020      Z87.891         Personal history of nicotine dep

endence Nicole Nath M.D.

 

                2020      J44.9           Chronic obstructive pulmonary di

sease, unspecified Nicole Nath M.D.

 

                2020      G47.33          Obstructive sleep apnea (adult) 

(pediatric) Nicole Nath M.D.

 

                2020      J30.9           Allergic rhinitis, unspecified K

Nicole jimenez M.D.







Plan of Treatment

Future Appointment(s):* 05/10/2021  3:30 pm - Nicole Nath M.D. at [facility] 
  Pulmonary/Thoracic

2021 - Rc Alaniz M.D.* * Recommendations:* -- continiue pepcid,
  Suclrafate, - metamucil -- EGD colon. -- Follow up in [facility] after that.









Functional Status





                Functional Condition Comment         Date            Status

 

                Independent with all ADL's                                 Activ

e

 

                Independent with all IADL's                                 Acti

ve







Mental Status





                Mental Condition Comment         Date            Status

 

                Cognitive ability not impaired                                 A

ctive







Referrals





                Refer to Dr     Reason for Referral Status          Appt Date

 

                          Rc Alaniz M.D. hx weight loss, adb discomf

ort, reflux, excessive 

bloating, gas, hx colon polyps  Created                   2021

 

                                        [address] [place] [address], [address]

 

                                        [place] [address]

 

                                        [phone]

## 2021-01-29 NOTE — CCD
Summarization Of Episode

                             Created on: 2021



LORI MARX

External Reference #: [id number]

: 1960

Sex: Male



Demographics





                          Address                   [address]

[place]  [address]

 

                          Home Phone                [phone]

 

                          Preferred Language        Unknown

 

                          Marital Status            

 

                          Confucianist Affiliation     NONE

 

                          Race                      White

 

                          Ethnic Group              Not  or 





Author





                          Author                    [facility]

 

                          Organization              [facility]

 

                          Address                   Unknown

 

                          Phone                     Unavailable







Support





                Name            Relationship    Address         Phone

 

                [facility]        [place]  [address] Unavailable

 

                    NONE, PT PER        Next Of Kin         -

                                        -

                          -, -  -                   -

 

                    [employer] [place]      [place]  [address]                    [phone]

 

                    [facility] [place]  [address]                    [phone]

 

                    DAVID MARX         Next Of Derek         [address]

[place]  [address]                     [phone]

 

                    David Marx         ECON                [address]

[place]  [address]                     Unavailable







Care Team Providers





                    Care Team Member Name Role                Phone

 

                    TABBY Olivares Unavailable         Unavailable

 

                    MolluraTABBY PA Unavailable         Unavailable

 

                    Mollura, E Natasha PA Unavailable         Unavailable

 

                    Mollura, E Natasha PA Unavailable         Unavailable

 

                    Mollura, E Natasha PA Unavailable         Unavailable

 

                    Mollura, TABBY Kaur PA Unavailable         Unavailable

 

                    Mollura, TABBY Kaur PA Unavailable         Unavailable

 

                    Mollura, E Natasha PA Unavailable         Unavailable

 

                    Mollura, E Natasha PA Unavailable         Unavailable

 

                    Mollura, E Natasha PA Unavailable         Unavailable

 

                    Mollura, E Natasha PA Unavailable         Unavailable

 

                    Mollura, E Natasha PA Unavailable         Unavailable

 

                    Mollura, E Natasha PA Unavailable         Unavailable

 

                    Mollura E Natasha PA Unavailable         Unavailable

 

                    Mollura E Natasha PA Unavailable         Unavailable

 

                    Mollura, E Natasha PA Unavailable         Unavailable

 

                    Mollura, E Natasha PA Unavailable         Unavailable

 

                    Mollura, E Natasha PA Unavailable         Unavailable

 

                    Mollura, E Natasha PA Unavailable         Unavailable

 

                    Mollura, E Natasha PA Unavailable         Unavailable

 

                    Mollura, E Natasha PA Unavailable         Unavailable

 

                    Mollura, E Natasha PA Unavailable         Unavailable

 

                    Mollura, E Natasha PA Unavailable         Unavailable

 

                    Mollura, E Natasha PA Unavailable         Unavailable

 

                    Mollura, E Natasha PA Unavailable         Unavailable

 

                    Mollura, E Natasha PA Unavailable         Unavailable

 

                    Mollura, E Natasha PA Unavailable         Unavailable

 

                    Mollura, E Natasha PA Unavailable         Unavailable

 

                    Mollura, E Natasha PA Unavailable         Unavailable

 

                    Mollura, E Natasha PA Unavailable         Unavailable

 

                    Mollura, E Natasha PA Unavailable         Unavailable

 

                    Mollura, E Natasha PA Unavailable         Unavailable

 

                    Mollura, E Natasha PA Unavailable         Unavailable

 

                    Mollura, E Natasha PA Unavailable         Unavailable

 

                    Mollura, E Natasha PA Unavailable         Unavailable

 

                    Mollura, E Natasha PA Unavailable         Unavailable

 

                    Nicole Nath MD  Unavailable         Unavailable

 

                    Nicole Nath MD  Unavailable         Unavailable

 

                    Nicole Nath MD  Unavailable         Unavailable

 

                    Nicole Nath MD  Unavailable         Unavailable

 

                    Nicole Nath MD  Unavailable         Unavailable

 

                    Nicole Nath MD  Unavailable         Unavailable

 

                    Nicole Nath MD  Unavailable         Unavailable

 

                    Nicole Nath MD  Unavailable         Unavailable

 

                    Nicole Nath MD  Unavailable         Unavailable

 

                    Nicole Nath MD  Unavailable         Unavailable

 

                    Nicole Nath MD  Unavailable         Unavailable

 

                    Nicole Nath MD  Unavailable         Unavailable

 

                    Nicole Nath MD  Unavailable         Unavailable

 

                    Nicole Nath MD  Unavailable         Unavailable

 

                    Nicole Nath MD  Unavailable         Unavailable

 

                    Nicole Nath MD  Unavailable         Unavailable

 

                    Nicole Nath MD  Unavailable         Unavailable

 

                    Nicole Nath MD  Unavailable         Unavailable

 

                    Nicole Nath MD  Unavailable         Unavailable

 

                    Nicole Nath MD  Unavailable         Unavailable

 

                    Nicole Nath MD  Unavailable         Unavailable

 

                    Nicole Nath MD  Unavailable         Unavailable

 

                    Nicole Nath MD  Unavailable         Unavailable

 

                    Nicole Nath MD  Unavailable         Unavailable

 

                    Braun, M Daisy PA-C Unavailable         Unavailable

 

                    Braun, M Daisy PA-C Unavailable         Unavailable

 

                    Braun, M Daisy PA-C Unavailable         Unavailable

 

                    Braun, M Daisy PA-C Unavailable         Unavailable

 

                    Braun, M Daisy PA-C Unavailable         Unavailable

 

                    Braun, M Daisy PA-C Unavailable         Unavailable

 

                    Braun, M Daisy PA-C Unavailable         Unavailable

 

                    Braun, M Daisy PA-C Unavailable         Unavailable

 

                    Braun, M Daisy PA-C Unavailable         Unavailable

 

                    Braun, M Daisy PA-C Unavailable         Unavailable

 

                    Braun, M Daisy PA-C Unavailable         Unavailable

 

                    Braun, M Daisy PA-C Unavailable         Unavailable

 

                    Braun, M Daisy PA-C Unavailable         Unavailable

 

                    Braun, M Daisy PA-C Unavailable         Unavailable

 

                    Braun, M Daisy PA-C Unavailable         Unavailable

 

                    Braun, M Daisy PA-C Unavailable         Unavailable

 

                    Braun, M Daisy PA-C Unavailable         Unavailable

 

                    Braun, M Daisy PA-C Unavailable         Unavailable

 

                    Braun, M Daisy PA-C Unavailable         Unavailable

 

                    Braun, M Daisy PA-C Unavailable         Unavailable

 

                    Braun, M Daisy PA-C Unavailable         Unavailable

 

                    Braun, M Daisy PA-C Unavailable         Unavailable

 

                    Braun, M Daisy PA-C Unavailable         Unavailable

 

                    Braun, M Daisy PA-C Unavailable         Unavailable

 

                    Braun, M Daisy PA-C Unavailable         Unavailable

 

                    Braun, M Daisy PA-C Unavailable         Unavailable

 

                    Braun, M Daisy PA-C Unavailable         Unavailable

 

                    Braun, M Daisy PA-C Unavailable         Unavailable

 

                    Braun, M Daisy PA-C Unavailable         Unavailable

 

                    Braun, M Daisy PA-C Unavailable         Unavailable

 

                    Braun, M Daisy PA-C Unavailable         Unavailable

 

                    Braun, M Daisy PA-C Unavailable         Unavailable



                                  



Re-disclosure Warning

          The records that you are about to access may contain information from 
federally-assisted alcohol or drug abuse programs. If such information is 
present, then the following federally mandated warning applies: This information
has been disclosed to you from records protected by federal confidentiality 
rules (42 CFR part 2). The federal rules prohibit you from making any further 
disclosure of this information unless further disclosure is expressly permitted 
by the written consent of the person to whom it pertains or as otherwise 
permitted by 42 CFR part 2. A general authorization for the release of medical 
or other information is NOT sufficient for this purpose. The Federal rules 
restrict any use of the information to criminally investigate or prosecute any 
alcohol or drug abuse patient.The records that you are about to access may 
contain highly sensitive health information, the redisclosure of which is 
protected by Article 27-F of the [facility] Public Health law. If you 
continue you may have access to information: Regarding HIV / AIDS; Provided by 
facilities licensed or operated by the [facility] Office of Mental Health; 
or Provided by the [facility] Office for People With Developmental 
Disabilities. If such information is present, then the following [place] 
mandated warning applies: This information has been disclosed to you from 
confidential records which are protected by state law. State law prohibits you 
from making any further disclosure of this information without the specific 
written consent of the person to whom it pertains, or as otherwise permitted by 
law. Any unauthorized further disclosure in violation of state law may result in
a fine or senior care sentence or both. A general authorization for the release of 
medical or other information is NOT sufficient authorization for further disc
losure.                                                                         
    



Family History

          



             Family Member Name Family Member Gender Family Member Status Date o

f Status 

Description                             Data Source(s)

 

           Unknown    Male       Problem                          [facility] ([facility]

 

           Unknown    Male       Problem                          [facility] ([facility])

 

           Unknown    Unknown    Problem                          [facility] ([facility]

 

           Unknown    Female     Problem                          [facility] ([facility]

[facility])



                                                                                
                                     



Encounters

          



           Encounter  Providers  Location   Date       Indications Data Source(s

)

 

                Outpatient                      [address]

[place] [address] 2020 12:00:00 AM

EST                                                 eCW1 ([facility])

 

                Outpatient      Attender: Daisy ROMANO-CConsultant: Natasha ROMANO                 2020

08:53:00 AM EST - 2020 08:53:00 AM EST                           [facility]

 

                Outpatient      Attender: Nicole Green/Aracelis/Juan Daniel/Blayne

ndl 2020 

02:30:00 PM EST                                     [facility] ([facility]

actice, )

 

                Outpatient      Attender: Daisy MINAYACConsultant: Natasha ROMANO                 08/10/2020

08:40:00 AM EDT - 08/10/2020 08:40:00 AM EDT                           [facility]

 

                Outpatient      Attender: Nicole Green/Aracelis/Juan Daniel/Blayne

ndl 2020 

01:00:00 PM EDT                                     [facility] ([facility]

actice, )



                                                                                
                                               



Medications

          



          Medication Brand Name Start Date Product Form Dose      Route     Admi

nistrative 

Instructions Pharmacy Instructions Status     Indications Reaction   Description

 Data 

Source(s)

 

                                        Sulfamethoxazole 800 MG / Trimethoprim 1

60 MG Oral Tablet Sulfamethoxazole-

Trimethoprim 800-160 MG   Sulfamethoxazole-Trimethoprim 800-160 MG 2020 

12:00:00 AM EST                                    active               Sulfamet

hoxazole-Trimethoprim 800-160 MG 

eCW1 ([facility])

 

           Famotidine 20 MG Oral Tablet [Pepcid] Pepcid     2020 12:00:00 

AM EST                       ORAL

                              active                                  [facility] ([facility])

 

        Prednisone 10 MG Oral Tablet Prednisone 2020 12:00:00 AM EST      

           ORAL                    

completed                                                       [facility] ([facility] )

 

                    Cholecalciferol 94730 UNT Oral Tablet Vitamin D3 Ultra Poten

cy 2020 

12:00:00 AM EDT               ORAL                 active                      M

EDENT ([facility])

 

        Stiolto Respimat Stiolto Respimat 2020 12:00:00 AM EDT            

     RESPIRATORY          

             completed                                           [facility] ([facility], )

 

                    formoterol fumarate 0.01 MG/ML Inhalant Solution [Perforomis

t] Perforomist         

2020 12:00:00 AM EDT                                    completed         

             [facility] ([facility], )

 

             Budesonide 0.5 MG/ML Inhalant Solution Budesonide   2020 12:0

0:00 AM EDT               

                                active                          [facility] ([facility])

 

                    arformoterol 0.0075 MG/ML Inhalant Solution [Brovana] Brovan

a             2020 

12:00:00 AM EDT                                    completed                    

  [facility] ([facility])

 

                                        60 ACTUAT Budesonide 0.16 MG/ACTUAT / fo

rmoterol fumarate 0.0045 MG/ACTUAT 

Metered Dose Inhaler [Symbicort] Symbicort    2020 12:00:00 AM EDT        

                   

RESPIRATORY                     active                                  [facility] (

[facility])

 

        Spiriva Respimat Spiriva Respimat 2020 12:00:00 AM EDT            

     RESPIRATORY          

             active                                              [facility] ([facility])



                                                                                
                                                                              



Insurance Providers

          



             Payer name   Policy type / Coverage type Policy ID    Covered party

 ID Covered 

party's relationship to mckeon Policy Mckeon             Plan Information

 

          [facility] FEDERAL [employer]OYEE PROGRAM           [id number]           SP        

          [id number]

 

          [facility] FEDERAL [employer]PLOYEE PROGRAM           [id number]           SP        

          [id number]

 

          SELF PAY ONLY           [id number]           SP                  [id number]

[id number]

 

          [employer] F[employer] BS        [id number]           [id number]                  [id number]

[id number]

 

          [employer] [id number]           Self                [id number]

[id number]

 

          [facility] FEDERAL O/P           [id number]           [id number]   

               [id number]

 

          [employer] [id number]           Self                [id number]

[id number]

 

          [employer]  C         [id number]           Self                [id number]

 

          [employer] [id number]           Self                [id number]

[id number]

 

          [facility] FEDERAL     -PHYS           [id number]          

 [id number]                  [id number]

 

          [facility] Federal Commercial [id number]           Self                [id number]

[id number]

 

          [employer] Federal Commercial [id number]           Self                [id number]

[id number]

 

          [employer] [id number]           Self                [id number]

[id number]

 

          [employer] [id number]           Self                [id number]

[id number]

 

          [employer] [id number]           Self                [id number]

[id number]

 

          [employer] Federal [employer] Commercial [id number]           Self                [id number]

[id number]

 

          [employer] FEDERAL           [id number]           SP                

  [id number]

 

          [employer] FEDERAL           [id number]           SP                

  [id number]

 

          BS Federal Commercial                     Self                 

 

          [employer] [place] FEDERAL P         [id number]           S              

     [id number]

 

          [employer] [place] P         [id number]           S                   

[id number]

 

                              [id number]                               [id number]



                                                                                
                                                                                
                                                                                
                                                          



Problems, Conditions, and Diagnoses

          



           Code       Display Name Description Problem Type Effective Dates Data

 Source(s)

 

             B370         Candidal stomatitis Candidal stomatitis Diagnosis    1

2020 08:53:00 AM 

[facility]

 

                    K219                Gastro-esophageal reflux disease without

 esophagitis Gastro-esophageal 

reflux disease without esophagitis Diagnosis           2020 08:53:00 AM [facility]

 

                    J449                Chronic obstructive pulmonary disease, u

nspecified Chronic obstructive 

pulmonary disease, unspecified Diagnosis           2020 08:53:00 AM [facility]

 

                                   Obstructive sleep apnea (adult) (pediatr

ic) Obstructive sleep apnea 

(adult) (pediatric) Diagnosis           2020 08:53:00 AM [facility]

 

                                   Encounter for general adult medical exam

ination with abnormal findings 

Encounter for general adult medical examination with abnormal findings Diagnosis

                          08/10/2020 08:40:00 AM EDT [facility]

 

             R233         Spontaneous ecchymoses Spontaneous ecchymoses Diagnosi

s    08/10/2020 08:40:00

 AM EDT                                 [facility]

 

             R140         Abdominal distension (gaseous) Abdominal distension (g

aseous) Diagnosis    

08/10/2020 08:40:00 AM EDT              [facility]



                                                                                
                                                                              



Surgeries/Procedures

          



             Procedure    Description  Date         Indications  Data Source(s)

 

             Spirometry                2020 12:00:00 AM EST              [facility] ([facility], 

)

 

             Spirometry                2020 12:00:00 AM EDT              [facility] ([facility], 

)



                                                                                
                  



Results

          



                    ID                  Date                Data Source

 

                    [id number]         2020 06:37:00 PM EST [facility] ([facility])









          Name      Value     Range     Interpretation Code Description Data Amanda

rce(s) Supporting 

Document(s)

 

           Glucose, Fasting 98 mg/dL        Normal (applies to non-numeric

 results)            

[facility] ([facility])  

 

             Creatinine For GFR 0.76 mg/dL   0.70-1.30    Normal (applies to non

-numeric results) 

                          [facility] ([facility])  

 

           Blood Urea Nitrogen 8 mg/dL    7-18       Normal (applies to non-nume

jenae results)            

[facility] ([facility])  

 

           Potassium Serum 4.4 meq/L  3.5-5.1    Normal (applies to non-numeric 

results)            

[facility] ([facility])  

 

                Glomerular Filtration Rate Laboratory test result               

  Normal (applies to non-

numeric results)                        [facility] ([facility]) 

 

 

                                        <content>Units are mL/min/1.73 

m2</content><br/><content></content><br/><content>Chronic Kidney Disease Staging
 per NKF:</content><br/><content></content><br/><content>Stage I & II   GFR >=60
       Normal to Mildly Decreased</content><br/><content>Stage III      GFR 30-
59      Moderately Decreased</content><br/><content>Stage IV       GFR 15-29    
  Severely Decreased</content><br/><content>Stage V        GFR <15        Very 
Little GFR Left</content><br/><content>ESRD           GFR <15 on 
RRT</content><br/><content></content> 

 

           Sodium Level 139 meq/L  136-145    Normal (applies to non-numeric res

ults)            [facility] 

([facility])         

 

           Anion Gap  4 meq/L    8-16       Below low normal            [facility] (

[facility])                                 

 

           Carbon Dioxide Level 33 meq/L   21-32      Above high normal         

   [facility] ([facility])                        

 

           Chloride Level 102 meq/L       Normal (applies to non-numeric r

esults)            [facility]

 ([facility])        

 

           Calcium Level 9.3 mg/dL  8.5-10.1   Normal (applies to non-numeric re

sults)            

[facility] ([facility])  









                    ID                  Date                Data Source

 

                    [id number]         2020 06:37:00 PM EST [facility] ([facility])









          Name      Value     Range     Interpretation Code Description Data Amanda

e(s) Supporting 

Document(s)

 

           Alt/SGPT   22 U/L     12-78      Normal (applies to non-numeric resul

ts)            [facility] ([facility])                  

 

           Ast/Sgot   20 U/L     7-37       Normal (applies to non-numeric resul

ts)            [facility]                   

 

           Bilirubin,Direct 0.2 mg/dL  0.0-0.2    Normal (applies to non-numeric

 results)            

[facility]  

 

           Bilirubin,Total 0.5 mg/dL  0.2-1.0    Normal (applies to non-numeric 

results)            

[facility] [facility] ([facility])  

 

           Alkaline Phosphatase 121 U/L         Above high normal         

   [facility]                        

 

           Albumin/Globulin Ratio 1.0                   Normal (applies to non-n

umeric results)            [facility] 

([facility])         

 

           Total Protein 7.5 GM/DL  6.4-8.2    Normal (applies to non-numeric re

sults)            [facility]

 ([facility])        

 

           Albumin    3.7 GM/DL  3.2-5.2    Normal (applies to non-numeric resul

ts)            [facility] 

([facility])         









                    ID                  Date                Data Source

 

                    [id number]         2020 06:37:00 PM EST [facility] ([facility])









          Name      Value     Range     Interpretation Code Description Data [facility](s) Supporting 

Document(s)

 

                CPK Creatine Phosphokinase 109 U/L                   [facility]

l (applies to non-numeric 

results)                                [facility] 

 

 

           CK-MB Value Mass 2.5 ng/mL             Normal (applies to non-numeric

 results)            [facility] 

([facility])         

 

           MB/CK Relative Index 2.29                  Normal (applies to non-num

jeff results)            [facility])         

 

                                        <content>DIAGNOSIS CRITERIA</content><br

/><content>MMB ng/ml       Relative 

Index (RI)</content><br/><content>NON-AMI               < or = 5               
N/A</content><br/><content>GRAY ZONE              > 5                < or = 
4</content><br/><content>AMI                    > 5                   > 
4</content><br/><content></content> 

 

           Troponin I Laboratory test result            Normal (applies to non-n

umeric results)            

[facility])  

 

                                        <content>Troponin I Reference Interval f

or Siemens Hawthorne 

LOCI:</content><br/><content></content><br/><content>99th Percentile= 0.00-0.045
 ng/ml</content><br/><content></content><br/><content>Risk 
Stratification:</content><br/><content><= 0.10 ng/ml   Decreased Risk for 
Adverse Clinical</content><br/><content>Events.</content><br/><content>0.10-1.50
 ng/ml   Increased Risk for Adverse Clinical</content><br/><content>Events. 
Evaluation of additional</content><br/><content>criterion and/or repeat testing 
in 2-6</content><br/><content>hours is suggested to rule out 
myocardial</content><br/><content>damage.</content><br/><content>>= 1.50 ng/ml  
 Indicative of Myocardial Injury.</content><br/><content></content> 









                    ID                  Date                Data Source

 

                    [id number]         2020 06:37:00 PM EST [facility] ([facility])









          Name      Value     Range     Interpretation Code Description Data [facility]

rce(s) Supporting 

Document(s)

 

           Hemoglobin 15.5 g/dL  13.5-17.5  Normal (applies to non-numeric resul

ts)            [facility] 

([facility])         

 

           Red Blood Count 4.79 10    4.30-6.10  Normal (applies to non-numeric 

results)            

[facility])  

 

           White Blood Count 7.8 10     4.0-10.0   Normal (applies to non-numeri

c results)            

[facility])  

 

                Mean Corpuscular Hemoglobin 32.4 pg         27.0-33.0       Norm

al (applies to non-numeric 

results)                                [facility] ([facility]) 

 

 

           Hematocrit 48.0 %     42.0-52.0  Normal (applies to non-numeric resul

ts)            [facility])         

 

           Mean Corpuscular Volume 100.2 fl   80.0-96.0  Above high normal      

      [facility] ([facility])                   

 

                Red Cell Distribution Width 12.9 %          11.5-14.5       Norm

al (applies to non-numeric 

results)                                [facility] ([facility]) 

 

 

                Mean Corpuscular HGB Conc 32.3 g/dL       32.0-36.5       Normal

 (applies to non-numeric 

results)                                [facility] ([facility]) 

 

 

                Platelet Count, Automated 330 10          150-450         Normal

 (applies to non-numeric results)

                                        [facility] ([facility]) 

 

 

           Lymph %    19.1 %     24.0-44.0  Below low normal            [facility] (

[facility])                                 

 

           Neutrophils % 69.4 %     36.0-66.0  Above high normal            [facility]

[facility] ([facility])                        

 

           Mono %     10.2 %     0.0-5.0    Above high normal            [facility] 

([facility])                                 

 

           Eos %      0.1 %      0.0-3.0    Normal (applies to non-numeric resul

ts)            [facility] ([facility])                   

 

           Baso %     0.8 %      0.0-1.0    Normal (applies to non-numeric resul

ts)            [facility] ([facility])                   

 

           Nucleated Red Blood Cell % 0.0 %      0-0        Normal (applies to n

on-numeric results)            

[facility] ([facility])  

 

           Neutrophils # 5.4 10     1.5-8.5    Normal (applies to non-numeric re

sults)            [facility] 

([facility])         

 

           Immature Granulocyte % 0.4 %      0-3.0      Normal (applies to non-n

umeric results)            

[facility] ([facility])  

 

           Eos #      0.0 10     0.0-0.5    Normal (applies to non-numeric resul

ts)            [facility] ([facility])                   

 

           Mono #     0.8 10     0.0-0.8    Normal (applies to non-numeric resul

ts)            [facility] ([facility])                  

 

           Lymph #    1.5 10     1.5-5.0    Normal (applies to non-numeric resul

ts)            [facility] 

([facility])         

 

           Baso #     0.1 10     0.0-0.2    Normal (applies to non-numeric resul

ts)            [facility] ([facility])                  









                    ID                  Date                Data Source

 

                    URINE CULTURE       2020 12:00:00 AM EST eCW1 ([facility])









          Name      Value     Range     Interpretation Code Description Data [facility]

rce(s) Supporting 

Document(s)

 

                                                  URINE CULTURE eCW1 ([facility])  









                    ID                  Date                Data Source

 

                    UA URINALYSIS       2020 12:00:00 AM EST eCW1 ([facility])









          Name      Value     Range     Interpretation Code Description Data [facility]

rce(s) Supporting 

Document(s)

 

                                                  UA URINALYSIS eCW1 ([facility])  









                    ID                  Date                Data Source

 

                    [id number]         12/10/2020 12:40:00 PM EST [facility] ([facility])









          Name      Value     Range     Interpretation Code Description Data [facility]

rce(s) Supporting 

Document(s)

 

           Laboratory test finding (navigational concept) Laboratory test result

                                  

[facility] ([facility])  

 

                                        ----------------------------------------

--------------------

Test: COVID-19 Nasal/Naspharynx

Result: NOT DETECTED           Reference Units: Not detected

                                        ----------------------------------------

--------------------

Note: Please consider re-collection of a new specimen, if

clinically indicated.



Note: The COVID-19 assay is under Emergency Use

Authorization(EUA) by the U.S. Food and Drug Administration.



[facility] is designated as a high complexity

laboratory by the Clinical Laboratory Improvement Amendments

of 1988(CLIA) and is qualified to perform this test.

ASSAY INFORMATION: Real Time RT-PCR



 









                    ID                  Date                Data Source

 

                    [id number]           12/10/2020 12:00:00 AM EST [facility]









          Name      Value     Range     Interpretation Code Description Data [facility]

rce(s) Supporting 

Document(s)

 

          2019-nCoV RNA XXX ELIZABETH+probe-Imp                                       

  [facility]     

 

                                        This lab was ordered by [facility] and reported by 

[employer]. 









                    ID                  Date                Data Source

 

                    [id number]            08/10/2020 11:11:00 AM EDT [facility] ([facility])









          Name      Value     Range     Interpretation Code Description Data [facility]

rce(s) Supporting 

Document(s)

 

           White Blood Count Laboratory test result 4.3-10.9                    

     [facility] ([facility])                       

 

          Hemoglobin 16.6      14.0-16.0                     [facility] ([facility])  

 

           Red Blood Count Laboratory test result 4.70-6.20                     

   [facility] ([facility])                       

 

          Platelets 279       150-450                       [facility] ([facility]  

 

          Hematocrit 49.7      41.0-51.0                     [facility] ([facility])  









                    ID                  Date                Data Source

 

                    [id number]            08/10/2020 11:11:00 AM EDT [facility] ([facility]









          Name      Value     Range     Interpretation Code Description Data [facility]

rce(s) Supporting 

Document(s)

 

           Albumin [Mass/volume] in Serum or Plasma 4.7                         

                [facility] ([facility])                       

 

             Alanine aminotransferase [Enzymatic activity/volume] in Serum or Pl

asma 25                                     

                          [facility] ([facility])  

 

           Calcium [Mass/volume] in Serum or Plasma 9.5                         

                [facility] ([facility]                       

 

           Carbon dioxide, total [Moles/volume] in Serum or Plasma 28           

                               [facility] 

([facility])           

 

           Alkaline phosphatase [Enzymatic activity/volume] in Serum or Plasma 1

17                                         

[facility] ([facility])    

 

           Chloride [Moles/volume] in Serum or Plasma 100                       

                  [facility] ([facility])                       

 

           Protein [Mass/volume] in Serum or Plasma 7.3                         

                [facility] ([facility])                       

 

           Potassium [Moles/volume] in Serum or Plasma 4.5                      

                   [facility] ([facility])                       

 

          Sodium    141                                     [facility] ([facility]  

 

           Urea nitrogen [Mass/volume] in Serum or Plasma 14                    

                      [facility] ([facility])                       

 

                Aspartate aminotransferase [Enzymatic activity/volume] in Serum 

or Plasma 27                              

                                        [facility] ([facility])  

 

          Glucose   97                                [facility] ([facility]  

 

          Creatinine For GFR 0.8                                     [facility] ([facility])  









                    ID                  Date                Data Source

 

                    [id number]            08/10/2020 11:11:00 AM EDT [facility] ([facility])









          Name      Value     Range     Interpretation Code Description Data [facility]

rce(s) Supporting 

Document(s)

 

          Thyroid Stimulating Hormone 0.89                                    [facility]

[facility] ([facility])  









                    ID                  Date                Data Source

 

                    [id number]            08/10/2020 11:11:00 AM EDT [facility] ([facility])









          Name      Value     Range     Interpretation Code Description Data [facility]

rce(s) Supporting 

Document(s)

 

           Hemoglobin A1c/Hemoglobin.total in Blood 6.0                         

                [facility] ([facility])                       









                    ID                  Date                Data Source

 

                    [id number]         08/10/2020 09:36:00 AM EDT [facility] ([facility])









          Name      Value     Range     Interpretation Code Description Data [facility]

rce(s) Supporting 

Document(s)

 

           Calcidiol [Mass/volume] in Serum or Plasma 23 ng/mL                  

                  [facility] ([facility])                        

 

                                        <content>VITAMIN-D(25HYDROXY)</content><

br/><content>Deficiency: <=20 

ng/ml</content><br/><content>Insufficiency: 21-29 
ng/ml</content><br/><content>Preferred level: => 30 ng/ml</content><br/><conten
t></content> 

 

           Thyrotropin [Units/volume] in Serum or Plasma 0.89 uIU/mL 0.47-5.01  

                      [facility] 

([facility])         

 

             Cobalamin (Vitamin B12) [Mass/volume] in Serum or Plasma 1084 pg/mL

   [id number]                   

                          [facility] ([facility])  









                    ID                  Date                Data Source

 

                    [id number]         08/10/2020 09:36:00 AM EDT [facility] ([facility])









          Name      Value     Range     Interpretation Code Description Data [facility]

rce(s) Supporting 

Document(s)

 

          Triglycerides 88 mg/dL                          [facility] ([facility])  

 

           Cholesterol 202 mg/dL  131-200    Above high normal            [facility]

 ([facility])                                

 

          Cve Panel Laboratory test result                               [facility] 

([facility])  

 

                                        LIPID PANEL

 

 

          HDL       69 mg/dL  29-86                         [facility] ([facility])  

 

          LDL       117 mg/dL                         [facility] ([facility])  

 

           Risk Factor 2.9        3.4-4.9    Below low normal            [facility] 

([facility])                                 

 

          LDL/HDL   1.70      1.00-3.55                     [facility] ([facility])  

 

                                        CVE

RISK           CHOL/HDL       LDL/HDL

MEN:

                                        1/2  AVERAGE          3.43          1.00

AVERAGE          4.97          3.55

                                        2X   AVERAGE          9.55          6.25

                                        3X   AVERAGE         23.99          7.99

WOMEN:

                                        1/2  AVERAGE          3.27          1.47

AVERAGE          4.44          3.22

                                        2X   AVERAGE          7.05          5.03

                                        3X   AVERAGE         11.04          6.14

 









                    ID                  Date                Data Source

 

                    [id number]         08/10/2020 09:36:00 AM EDT [facility] ([facility])









          Name      Value     Range     Interpretation Code Description Data [facility]

rce(s) Supporting 

Document(s)

 

           Comprehensive Metabo Laboratory test result                          

        [facility] ([facility])                                

 

                                        COMPREHENSIVE METABOLIC PANEL

 

 

          Chloride  100 meq/L                         [facility] ([facility])  

 

          Potassium 4.5 meq/L 3.6-5.0                       [facility] ([facility])  

 

          Sodium    141 meq/L 134-153                       [facility] ([facility])  

 

          Co2       28 meq/L  22-30                         [facility] ([facility])  

 

          BUN       11 mg/dL  7-21                          [facility] ([facility])  

 

          Glucose   97 mg/dL                          [facility] ([facility])  

 

          Creatinine 0.8 mg/dL 0.7-1.5                       [facility] ([facility])  

 

          BUN/Creat 14        8-27                          [facility] ([facility])  

 

          Total Protein 7.3 g/dL  6.3-8.2                       [facility] ([facility])  

 

          A/G Ratio 1.8       0.8-2.0                       [facility] ([facility])  

 

          Globulin  2.6 GM/DL 2.4-3.2                       [facility] ([facility])  

 

          Albumin   4.7 g/dL  3.9-5.0                       [facility] ([facility])  

 

          Calcium   9.5 mg/dL 8.4-10.2                      [facility] ([facility])  

 

          Alkaline Phos 117 U/L                           [facility] ([facility])  

 

          Total Bili 0.7 mg/dL 0.2-1.3                       [facility] ([facility])  

 

          Anion Gap 13.0 mmol/L 8.0-16.0                      [facility] ([facility])  

 

          Sgot/Ast  27 U/L    5-40                          [facility] ([facility])  

 

          SGPT/Alt  25 U/L    7-56                          [facility] ([facility])  

 

           Afr Amer GFR Laboratory test result                                  

[facility] ([facility])                                 

 

                                        Male GFR Interprentation

                                        20-49 yrs     >60 mL/min   Normal

                                        50-59 yrs     >56 mL/min   Normal

                                        60-69 yrs     >49 mL/min   Normal

                                        70-79yrs      >42 mL/min   Normal

                                        80 and above  >35 mL/min   Normal

Female GFR  Interpretation

                                        20-39 yrs     >60 mL/min   Normal

                                        40-49 yrs     >58 mL/min   Normal

                                        50-59 yrs     >51 mL/min   Normal

                                        60-69 yrs     >45 mL/min   Normal

                                        70-79 yrs     >39 mL/min   Normal

                                        80 and above  >32 mL/min   Normal

 

 

          Age       60 yrs                                  [facility] ([facility])  

 

          Non-Aa GFR Laboratory test result                               [facility]

 ([facility]) 

 









                    ID                  Date                Data Source

 

                    [id number]         08/10/2020 09:36:00 AM EDT [facility] ([facility])









          Name      Value     Range     Interpretation Code Description Data [facility]

rce(s) Supporting 

Document(s)

 

           CBC W/Automated Diff Laboratory test result                          

        [facility] ([facility])                                

 

                                        COMPLETE BLOOD COUNT

 

 

          WBC       6.7 10^3/uL 4.2-11.0                      [facility] ([facility])  

 

          Hematocrit 49.7 %    41.0-51.0                     [facility] ([facility])  

 

          RBC       5.00 10^6/uL 4.50-6.30                     [facility] ([facility])  

 

           Hemoglobin 16.6 g/dL  14.0-16.0  Above high normal            [facility] 

([facility])                        

 

          MCH       33.2 pg   27.0-34.0                     [facility] ([facility])  

 

          MCHC      33.4 g/dL 31.0-36.0                     [facility] ([facility])  

 

           MCV        99.4 fL    80.0-94.0  Above high normal            [facility] 

([facility])                                 

 

          RDW       13.3 %    11.5-14.8                     [facility] ([facility])  

 

          Platelets 279 10^3/uL 150-450                       [facility] ([facility])  

 

          MPV       8.8 fL    7.4-10.4                      [facility] ([facility])  

 

           Lymph      18.4 %     25.0-40.0  Below low normal            [facility] (

[facility])                                 

 

          Neut      69.6 %    37.0-80.0                     [facility] ([facility])  

 

          Eos       2.1 %     0.0-7.0                       [facility] ([facility])  

 

          Mono      8.8 %     3.0-8.0   Above high normal           [facility] ([facility])  

 

          Baso      1.0 %     0.0-2.0                       [facility] ([facility])  

 

          %Ig       0.1 %     0.0-0.0   Above high normal           [facility] ([facility])  

 

          %NRBC     0.0 %     0.0-0.0                       [facility] ([facility])  

 

          #Neut     4.64 10^3/uL 2.00-6.90                     [facility] ([facility])  

 

          #Mono     0.59 10^3/uL 0.00-0.90                     [facility] ([facility])  

 

          #Eos      0.14 10^3/uL 0.00-0.70                     [facility] ([facility])  

 

          #Lymph    1.23 10^3/uL 0.60-3.40                     [facility] ([facility])  

 

          #Ig       0.01 10^3/uL 0.00-0.10                     [facility] ([facility])  

 

          #Baso     0.07 10^3/uL 0.00-0.20                     [facility] ([facility])  

 

          #NRBC     0.00 10^3/uL 0.00-0.00                     [facility] ([facility])  

 

           Manual Diff Laboratory test result                                  [facility]

[facility] ([facility])

                                         

 

          %Lymph    18 %      25-40     Below low normal           [facility] ([facility])  

 

          Segs      71 %      37-80                         [facility] ([facility])  

 

          %Eos      1 %       0-7                           [facility] ([facility])  

 

          RBC Morph Laboratory test result                               [facility] 

([facility])  

 

          %Mono     10 %      3-8       Above high normal           [facility] ([facility])  









                    ID                  Date                Data Source

 

                    [id number]         08/10/2020 09:36:00 AM EDT [facility] ([facility])









          Name      Value     Range     Interpretation Code Description Data [facility]

rce(s) Supporting 

Document(s)

 

           Hemoglobin A1c/Hemoglobin.total in Blood 6.0 %      4.4-6.1          

                [facility] ([facility])                       

 

                                        {A1]

{HB]

 









                    ID                  Date                Data Source

 

                    [id number]         08/10/2020 09:36:00 AM EDT [facility] ([facility])









          Name      Value     Range     Interpretation Code Description Data Amanda

rce(s) Supporting 

Document(s)

 

           Lab - Specimen Rejec Laboratory test result                          

        [facility] ([facility])                                 

 

                                        Specimen Integrity/Specimen Recollection

The patient sample needs to be resubmitted for the following reason:

 

 

           Test(s) Ordered Laboratory test result                               

   [facility] ([facility])                                 

 

           Rejection Reason Laboratory test result                              

    [facility] ([facility])                                 

 

                                        {          One or more of the tests you 

ordered cannot be performed.

{          Please recollect, reorder, and resubmit if needed.

 









                    ID                  Date                Data Source

 

                    [id number]         08/10/2020 09:36:00 AM EDT [facility] ([facility])









          Name      Value     Range     Interpretation Code Description Data Amanda

rce(s) Supporting 

Document(s)

 

           Hemoglobin A1c/Hemoglobin.total in Blood Laboratory test result      

                            [facility] 

([facility])         

 

           Thyrotropin [Units/volume] in Serum or Plasma Laboratory test result 

                                 

[facility] ([facility])  

 

                    Cobalamin (Vitamin B12) [Mass/volume] in Serum or Plasma Lab

oratory test result 

                                                [facility] ([facility]

lin[facility])  

 

           Calcidiol [Mass/volume] in Serum or Plasma Laboratory test result    

                              [facility] 

([facility])         

 

           Folate [Mass/volume] in Red Blood Cells Laboratory test result       

                           [facility] 

([facility])         









                    ID                  Date                Data Source

 

                    [id number]     08/10/2020 04:12:00 PM EDT [facility]









          Name      Value     Range     Interpretation Code Description Data Amanda

rce(s) Supporting 

Document(s)

 

                Cobalamin (Vitamin B12) [Mass/volume] in Serum or Plasma 1084 PG

/ML      232 - 1245       

                                        [facility]  









                    ID                  Date                Data Source

 

                    [id number]     08/10/2020 04:12:00 PM EDT [facility]









          Name      Value     Range     Interpretation Code Description Data Amanda

rce(s) Supporting 

Document(s)

 

           Calcidiol [Moles/volume] in Serum or Plasma 23 NG/ML                 

                   [facility]                                 

 

                                                                     VITAMIN-D(2

5HYDROXY)                          

Deficiency: <=20 ng/ml                          Insufficiency: 21-29 ng/ml      
                    Preferred level: => 30 ng/ml 









                    ID                  Date                Data Source

 

                    [id number]     08/10/2020 04:12:00 PM EDT [facility]









          Name      Value     Range     Interpretation Code Description Data Amanda

rce(s) Supporting 

Document(s)

 

                          Thyrotropin [Units/volume] in Serum or Plasma by Detec

tion limit <= 0.05 mIU/L 

0.89 uIU/mL  0.47 - 5.01                            [facility]  









                    ID                  Date                Data Source

 

                    [id number]     08/10/2020 03:52:00 PM EDT [facility]









          Name      Value     Range     Interpretation Code Description Data Amanda

rce(s) Supporting 

Document(s)

 

          CVE PANEL                                         [facility]

al  

 

                                            LIPID PANEL 

 

           Cholesterol [Mass/volume] in Serum or Plasma 202 MG/DL  131 - 200  H 

                    [facility]                            

 

           Deprecated Triglyceride [Mass/volume] in Serum or Plasma 88 MG/DL   3

5 - 160                         

[facility]                   

 

          HDL       69 MG/DL  29 - 86                       [facility]

al  

 

                    Cholesterol in LDL [Mass/volume] in Serum or Plasma by Direc

t assay 117 mg/dL           65

 - 175                                          [facility]  

 

                    Cholesterol.total/Cholesterol in HDL [Mass Ratio] in Serum o

r Plasma 2.9                 3.4 - 

4.9             L                               [facility]  

 

          LDL/HDL   1.70      1.00 - 3.55                     [facility]

ital  

 

                                        CVE         RISK           CHOL/HDL     

  LDL/HDLMEN:    1/2  AVERAGE          

3.43          1.00         AVERAGE          4.97          3.55    2X   AVERAGE  
       9.55          6.25    3X   AVERAGE         23.99          7.99WOMEN:    
1/2  AVERAGE          3.27          1.47         AVERAGE          4.44          
3.22    2X   AVERAGE          7.05          5.03    3X   AVERAGE         11.04  
       6.14 









                    ID                  Date                Data Source

 

                    [id number]     08/10/2020 03:46:00 PM EDT [facility]









          Name      Value     Range     Interpretation Code Description Data Amanda

rce(s) Supporting 

Document(s)

 

          COMPREHENSIVE METABOLIC PANEL                                         

[facility]  

 

                                            COMPREHENSIVE METABOLIC PANEL 

 

           Sodium [Moles/volume] in Serum or Plasma 141 mEq/L  134 - 153        

                [facility]                                 

 

           Potassium [Moles/volume] in Serum or Plasma 4.5 mEq/L  3.6 - 5.0     

                   [facility]                            

 

           Chloride [Moles/volume] in Serum or Plasma 100 mEq/L  98 - 107       

                  [facility]                                 

 

           Carbon dioxide, total [Moles/volume] in Serum or Plasma 28 MEQ/L   22

 - 30                          

[facility]                   

 

           Glucose [Mass/volume] in Serum or Plasma 97 MG/DL   65 - 110         

                [facility]                                 

 

          BUN       11 MG/DL  7 - 21                        [facility]

al  

 

           Creatinine [Mass/volume] in Serum or Plasma 0.8 MG/DL  0.7 - 1.5     

                   [facility]                            

 

          BUN/CREAT 14        8 - 27                        [facility]

al  

 

           Protein [Mass/volume] in Serum or Plasma 7.3 G/DL   6.3 - 8.2        

                [facility]                                 

 

           Albumin [Mass/volume] in Serum or Plasma 4.7 G/DL   3.9 - 5.0        

                [facility]                                 

 

           Globulin [Mass/volume] in Serum by calculation 2.6 GM/DL  2.4 - 3.2  

                      [facility]                            

 

          A/G RATIO 1.8       0.8 - 2.0                     [facility]  

 

           Calcium [Mass/volume] in Serum or Plasma 9.5 MG/DL  8.4 - 10.2       

                [facility]                                 

 

           Bilirubin.total [Mass/volume] in Serum or Plasma 0.7 MG/DL  0.2 - 1.3

                        

[facility]                   

 

                    Alkaline phosphatase [Enzymatic activity/volume] in Serum or

 Plasma 117 U/L             38 -

126                                             [facility]  

 

                          Aspartate aminotransferase [Enzymatic activity/volume]

 in Serum or Plasma 27 U/L

             5 - 40                                 [facility]  

 

                    Alanine aminotransferase [Enzymatic activity/volume] in Seru

m or Plasma 25 U/L              7

- 56                                            [facility]  

 

           Anion gap 3 in Serum or Plasma 13.0 mmol/L 8.0 - 16.0                

       [facility]

                                         

 

          AGE       60 yrs                                  [facility]

al  

 

          NON-AA GFR >60 mL/min                               [facility]

ital  

 

          AFR AMER GFR >60 mL/min                               [facility]

spital  

 

                                                                     Male GFR In

terprentation                  20-49 yrs

    >60 mL/min   Normal                  50-59 yrs     >56 mL/min   Normal      
           60-69 yrs     >49 mL/min   Normal                  70-79yrs      >42 
mL/min   Normal                  80 and above  >35 mL/min   Normal              
     Female GFR  Interpretation                  20-39 yrs     >60 mL/min   
Normal                  40-49 yrs     >58 mL/min   Normal                  50-59
yrs     >51 mL/min   Normal                  60-69 yrs     >45 mL/min   Normal  
               70-79 yrs     >39 mL/min   Normal                  80 and above  
>32 mL/min   Normal 









                    ID                  Date                Data Source

 

                    [id number]     08/10/2020 01:48:00 PM EDT [facility]









          Name      Value     Range     Interpretation Code Description Data Amanda

rce(s) Supporting 

Document(s)

 

          CBC W/AUTOMATED DIFF                                         [facility]  

 

                                            COMPLETE BLOOD COUNT 

 

           Leukocytes [#/volume] in Blood by Automated count 6.7 10^3/uL 4.2 - 1

1.0                       

[facility]                   

 

             Erythrocytes [#/volume] in Blood by Automated count 5.00 10^6/uL 4.

50 - 6.30                

                          [facility]     

 

           Hemoglobin [Mass/volume] in Blood 16.6 g/dL  14.0 - 16.0 H           

          [facility]                                 

 

           Hematocrit [Volume Fraction] of Blood by Automated count 49.7 %     4

1.0 - 51.0                       

[facility]                   

 

                    Erythrocyte mean corpuscular volume [Entitic volume] by Auto

mated count 99.4 fL             

80.0 - 94.0     H                               [facility]  

 

                          Erythrocyte mean corpuscular hemoglobin [Entitic mass]

 by Automated count 33.2 

pg           27.0 - 34.0                            [facility]  

 

                                        Erythrocyte mean corpuscular hemoglobin 

concentration [Mass/volume] by Automated

 count     33.4 g/dL  31.0 - 36.0                       [facility]  

 

             Erythrocyte distribution width [Ratio] by Automated count 13.3 %   

    11.5 - 14.8                

                          [facility]     

 

           Platelets [#/volume] in Blood by Automated count 279 10^3/uL 150 - 45

0                        

[facility]                   

 

                    Platelet mean volume [Entitic volume] in Blood by Automated 

count 8.8 fL              7.4 - 

10.4                                            [facility]  

 

           Neutrophils/100 leukocytes in Blood by Automated count 69.6 %     37.

0 - 80.0                       

[facility]                   

 

           Lymphocytes/100 leukocytes in Blood by Manual count 18.4 %     25.0 -

 40.0 L                     

[facility]                   

 

           Monocytes/100 leukocytes in Blood by Automated count 8.8 %      3.0 -

 8.0  H                     

[facility]                   

 

           Eosinophils/100 leukocytes in Blood by Automated count 2.1 %      0.0

 - 7.0                        

[facility]                   

 

           Basophils/100 leukocytes in Blood by Automated count 1.0 %      0.0 -

 2.0                        

[facility]                   

 

          %IG       0.1 %     0.0 - 0.0 H                   [facility]

[facility]  

 

          %NRBC     0.0 %     0.0 - 0.0                     [facility]

[facility]  

 

           Neutrophils [#/volume] in Blood by Automated count 4.64 10^3/uL 2.00 

- 6.90                       

[facility]                   

 

           Lymphocytes [#/volume] in Blood by Automated count 1.23 10^3/uL 0.60 

- 3.40                       

[facility]                   

 

           Monocytes [#/volume] in Blood by Automated count 0.59 10^3/uL 0.00 - 

0.90                       

[facility]                   

 

           Eosinophils [#/volume] in Blood by Automated count 0.14 10^3/uL 0.00 

- 0.70                       

[facility]                   

 

           Basophils [#/volume] in Blood by Automated count 0.07 10^3/uL 0.00 - 

0.20                       

[facility]                   

 

          #IG       0.01 10^3/uL 0.00 - 0.10                     [facility]

ospital  

 

          #NRBC     0.00 10^3/uL 0.00 - 0.00                     [facility]

ospital  

 

          MANUAL DIFF SEE BELOW                               [facility]

ital  

 

           Segmented neutrophils/100 leukocytes in Blood by Manual count 71 %   

    37 - 80                          

[facility]                   

 

          %LYMPH    18 %      25 - 40   L                   [facility]

al  

 

          %MONO     10 %      3 - 8     H                   [facility]

al  

 

          %EOS      1 %       0 - 7                         [facility]

[facility]  

 

          RBC MORPH MORPH IS NORMAL                               [facility]  









                    ID                  Date                Data Source

 

                    [id number]     08/10/2020 01:27:00 PM EDT [facility]









          Name      Value     Range     Interpretation Code Description Data Amanda

rce(s) Supporting 

Document(s)

 

           Hemoglobin A1c/Hemoglobin.total in Blood 6.0 %      4.4 - 6.1        

                [facility]                                 

 

                                        {A1]{HB] 









                    ID                  Date                Data Source

 

                    [id number]     08/10/2020 12:52:00 PM EDT [facility]









          Name      Value     Range     Interpretation Code Description Data Amanda

rce(s) Supporting 

Document(s)

 

          LAB - SPECIMEN REJECTION                                         [facility]  

 

                                        Specimen Integrity/Specimen Recollection

 The patient sample needs to be 

resubmitted for the following reason: 

 

          Test(s) Ordered FOLIC ACID RBC                               [facility]  

 

          Rejection Reason QNS                                     [facility]  

 

                                        {          One or more of the tests you 

ordered cannot be performed.{          

Please recollect, reorder, and resubmit if needed. 







                                        Procedure

 

                                          



                                                                                
                                                                                
                                                                                
                                                                                
                                  



Social History

          



           Code       Duration   Value      Status     Description Data Source(s

)

 

           Smoking    2020 12:00:00 AM EST Former Smoker completed  Former

 Smoker eCW1 

([facility])

 

                    Smoking             2020 12:00:00 AM EST - 2019 

12:00:00 AM EST Patient is a 

former smoker       completed           Patient is a former smoker [facility] ([facility])



                                                                                
                           



Vital Signs

          



                    ID                  Date                Data Source

 

                    UNK                                      









           Name       Value      Range      Interpretation Code Description Data

 Source(s)

 

           Body surface area Derived from formula 1.51 m2                       

   1.51 m2    [facility] ([facility])

 

           Body weight 49.442 kg                        49.442 kg  [facility] ([facility])

 

           Ideal body weight 130 [lb_av]                       130 [lb_av] [facility] ([facility])

 

           Body mass index (BMI) [Ratio] 18.7 kg/m2                       18.7 k

g/m2 [facility] ([facility])

 

           Body weight 109.00 [lb_av]                       109.00 [lb_av] [facility] ([facility])

 

           Body height 64 [in_i]                        64 [in_i]  [facility] ([facility])

 

                                        5'4" 

 

           Diastolic blood pressure 68 mm[Hg]                        68 mm[Hg]  

[facility] ([facility])

 

           Systolic blood pressure 128 mm[Hg]                       128 mm[Hg] M

[facility] ([facility], )

 

           Diastolic blood pressure 82 mm[Hg]                        82 mm[Hg]  

eCW1 ([facility])

 

           Systolic blood pressure 122 mm[Hg]                       122 mm[Hg] e

CW1 ([facility])

 

           Body temperature 98.2 [degF]                       98.2 [degF] eCW1 (

[facility])

 

           Respiratory rate 20 /min                          20 /min    eCW1 ([facility])

 

           Heart rate 86 /min                          86 /min    eCW1 ([facility])

 

           Body mass index (BMI) [Ratio] 0.13 kg/m2                       0.13 k

g/m2 eCW1 ([facility])

 

           Body height                                   [in_i]    eCW1 ([facility])

 

           Body weight 110 [lb_av]                       110 [lb_av] eCW1 ([facility])

 

           Body surface area Derived from formula 1.54 m2                       

   1.54 m2    [facility] ([facility])

 

           Body mass index (BMI) [Ratio] 19.4 kg/m2                       19.4 k

g/m2 [facility] ([facility])

 

           Body height 64 [in_i]                        64 [in_i]  [facility]

 

                                        5'4" 

 

           Body weight 51.370 kg                        51.370 kg  [facility]

 

           Body weight 113.25 [lb_av]                       113.25 [lb_av] [facility]

T ([facility])

 

           Oxygen saturation in Arterial blood by Pulse oximetry 96 %           

                  96 %       [facility] 

([facility])

 

           Respiratory rate 16 /min                          16 /min    [facility] (

[facility])

 

           Body temperature 97.1 [degF]                       97.1 [degF] [facility]

 ([facility])

 

           Heart rate 100 /min                         100 /min   [facility]

 

           Diastolic blood pressure 80 mm[Hg]                        80 mm[Hg]  

[facility] ([facility])

 

           Systolic blood pressure 122 mm[Hg]                       122 mm[Hg] M

[facility] ([facility])

 

           Body surface area Derived from formula 1.54 m2                       

   1.54 m2    [facility]

 

           Body weight 51.370 kg                        51.370 kg  [facility] ([facility])

 

           Ideal body weight 130 [lb_av]                       130 [lb_av] [facility]

T ([facility])

 

           Body mass index (BMI) [Ratio] 19.4 kg/m2                       19.4 k

g[facility]m2 [facility]

 

           Body weight 113.25 [lb_av]                       113.25 [lb_av] [facility]

T ([facility])

 

           Body height 64 [in_i]                        64 [in_i]  [facility] ([facility])

 

                                        5'4" 

 

           Body temperature 99.1 [degF]                       99.1 [degF] [facility]

 ([facility])

 

           Oxygen saturation in Arterial blood by Pulse oximetry 93 %           

                  93 %       [facility] 

([facility])

 

           Heart rate 96 /min                          96 /min    [facility] ([facility])

 

           Diastolic blood pressure 74 mm[Hg]                        74 mm[Hg]  

[facility] ([facility])

 

           Systolic blood pressure 122 mm[Hg]                       122 mm[Hg] M

[facility] ([facility])

 

           Body surface area Derived from formula 1.50 m2                       

   1.50 m2    [facility] ([facility])

 

           Body mass index (BMI) [Ratio] 18.3 kg/m2                       18.3 k

g/m2 [facility] ([facility])

 

           Body height 64 [in_i]                        64 [in_i]  [facility] ([facility])

 

                                        5'4" 

 

           Body weight 48.252 kg                        48.252 kg  [facility] ([facility])

 

           Body weight 106.38 [lb_av]                       106.38 [lb_av] [facility]

T ([facility])

 

           Oxygen saturation in Arterial blood by Pulse oximetry 95 %           

                  95 %       [facility] 

([facility])

 

           Respiratory rate 16 /min                          16 /min    [facility] (

[facility])

 

           Body temperature 97.2 [degF]                       97.2 [degF] [facility]

 ([facility])

 

           Heart rate 90 /min                          90 /min    [facility] [facility] ([facility])

 

           Diastolic blood pressure 64 mm[Hg]                        64 mm[Hg]  

[facility] ([facility])

 

           Systolic blood pressure 112 mm[Hg]                       112 mm[Hg] M

[facility] ([facility])

 

           Body surface area 1.50 m2                          1.50 m2    [facility] 

([facility])

 

           Body weight 51.313 kg                        51.313 kg  [facility] ([facility])

 

           Body mass index (BMI) [Ratio] 19.4 kg/m2                       19.4 k

g/m2 [facility] ([facility])

 

           Body weight 113.12 [lb_av]                       113.12 [lb_av] [facility]

T ([facility], )

 

           Body height 64 [in_i]                        64 [in_i]  [facility] ([facility])

 

                                        5'4" 

 

           Body temperature 98.3 [degF]                       98.3 [degF] [facility]

 ([facility])

 

           Oxygen saturation in Arterial blood by Pulse oximetry 93 %           

                  93 %       [facility] 

([facility])

 

           Heart rate 103 /min                         103 /min   [facility] ([facility])

 

           Diastolic blood pressure 90 mm[Hg]                        90 mm[Hg]  

[facility] ([facility])

 

           Systolic blood pressure 138 mm[Hg]                       138 mm[Hg] [facility] ([facility], )

 

           Body height 64 [in_i]                        64 [in_i]  [facility] ([facility])

 

                                        5'4" 



                                                                                
                  



Patient Treatment Plan of Care

          



             Planned Activity Planned Date Details      Description  Data Source

(s)

 

                          Sulfamethoxazole 800 MG / Trimethoprim 160 MG Oral Tab

let 2020 12:00:00 AM

 EST                                                        [id number] ([facility]

## 2021-01-29 NOTE — CCD
Continuity of Care Document (CCD)

                             Created on: 2021



Neftaly Guillory

External Reference #: [id number]

: 1960

Sex: Male



Demographics





                          Address                   [address] [place] [address]

 

                          Home Phone                [phone]

 

                          Preferred Language        Unknown

 

                          Marital Status            

 

                          Mandaen Affiliation     Unknown

 

                          Race                      White

 

                          Ethnic Group              Not  or 





Author





                          Author                    Neftaly MORALES PA-C

 

                          Organization              Unknown

 

                          Address                   [address] [place] [address]



 

                          Phone                     [phone]







Care Team Providers





                    Care Team Member Name Role                Phone

 

                    [facility] AUT[facility]                [phone]

 

                    [facility] AUTM                [phone]

 

                    Daisy Morales PA-C  AUTM                [phone]

 

                    Sharath Castillo MD AUTM                [phone]

 

                    Solitario Latif MD    AUTM                [phone]

 

                    [facility]         AUTM                [phone]







Problems





                    Active Problems     Provider            Date

 

                    Obstructive sleep apnea syndrome JUAN ANTONIO Taylor  Onset: 

2019

 

                    Tobacco user        JUAN ANTONIO Taylor  Onset: 2019

 

                    Chronic obstructive lung disease JUAN ANTONIO Taylor  Onset: 

2019







Social History





                Type            Date            Description     Comments

 

                Birth Sex                       Unknown          

 

                Tobacco Use     Start: Unknown  Current Cigar Smoker 1 Daily  

 

                ETOH Use                        Denies alcohol use  

 

                Recreational Drug Use                 Denies Drug Use  

 

                Tobacco Use     Start: Unknown End: Unknown Patient is a former 

smoker quit  

 

                Smoking Status  Reviewed: 19 Patient is a former smoker qu

it  







Allergies, Adverse Reactions, Alerts





             Active Allergies Reaction     Severity     Comments     Date

 

             NKDA                                                2019

 

             NKFA                                                2019

 

             [employer]                                                2019







Medications





           Active Medications SIG        Qnty       Indications Ordering Provide

r Date

 

                          Pepcid                     20mg Tablets               

    1 by mouth twice a day

as needed gerd  180tabs                         Toro Ny MD 2020

 

                                        Vitamin D3 Ultra Potency                

     1.25mg (58683 [address]) Tablets          

             1 tab by mouth every week 12tabs                    Toro Ny MD 2020

 

           Patient Unable To Tolerate Cpap, Please Evaluate                     

             Toro Ny MD 

2019

 

                          Pantoprazole Sodium                     40mg Tablets D

R                   1 by 

mouth every day 30tabs                          Toro Ny MD 2019

 

                          Nebulizer Kit/Tubing/Mouthpiece                      K

it                   use 

with neb tx every 4-6 hours as needed. dx j44.1 4units                          

Toro Ny MD 

2019

 

                          Nebulizer                      Device                 

  use every 4-6 hours as 

needed for wheezing and/or shortness of breath 1units          J44.9           H

arari Ny MD 

2019

 

                          Simvastatin                     20mg Tablets          

         Take 1 Tablet By 

Mouth Every Day 90tabs          E78.5           Toro Ny MD 2019

 

                          Tamsulosin HCL                     0.4mg Capsules     

              1 tab by 

mouth every day 90caps                          Anat Sharma ANP-BC, PNP 

00

 

           Apple Cider Vinegar Tablets                                  Unknown 

   

 

                          Levalbuterol Tartrate                     45mcg/Act Ae

rosol                   

Inl 2 PFS PO Q 4 H prn                                 Unknown         

0

 

                          Stiolto Respimat                     2.5-2.5mcg/Act Ae

rosol                   

Inl 2 PFS PO qd                                 Unknown         

 

                          Budesonide                     1mg/2ML Suspension     

              Inhale 

Contents Of 2 Vials Via Nebulizer Twice A Day                                 Un

known         

 

                          Aspirin 81                     81mg Tablets DR        

           1 by mouth 

every day                                       Unknown         

 

           Clotrimazole                     10mg Zack                         

                           Unknown    



 

                          Sucralfate                     1gm Tablets            

       Take 1 Tablet By 

Mouth Four Times Daily For 4 Weeks 360tabs                         Toro doherty MD 







Immunizations





                                        Description

 

                                        No Information Available







Vital Signs





                Date            Vital           Result          Comment

 

                2020  9:27am BP Systolic     122 mmHg         

 

                    BP Diastolic        80 mmHg              

 

                    Heart Rate          100 /min             

 

                    Body Temperature    97.1 F             

 

                    Respiratory Rate    16 /min              

 

                    O2 % BldC Oximetry  96 %                 

 

                    Weight              113.25 lb            

 

                    Weight              51.370 kg            

 

                    Height              64 inches           5'4"

 

                    BMI (Body Mass Index) 19.4 kg/m2           

 

                    BSA (Body Surface Area) 1.54 m2              

 

                08/10/2020  8:54am BP Systolic     112 mmHg         

 

                    BP Diastolic        64 mmHg              

 

                    Heart Rate          90 /min              

 

                    Body Temperature    97.2 F             

 

                    Respiratory Rate    16 /min              

 

                    O2 % BldC Oximetry  95 %                 

 

                    Weight              106.38 lb            

 

                    Weight              48.252 kg            

 

                    Height              64 inches           5'4"

 

                    BMI (Body Mass Index) 18.3 kg/m2           

 

                    BSA (Body Surface Area) 1.50 m2              







Results





        Test    Acquired Date Facility Test    Result  H/L     Range   Note

 

                    CBC With Differential 2020          [facility]

             White Blood Count 7.8 10       Normal       4.0-10.0      

 

             Red Blood Count 4.79 10      Normal       4.30-6.10     

 

             Hemoglobin   15.5 g/dL    Normal       13.5-17.5     

 

             Hematocrit   48.0 %       Normal       42.0-52.0     

 

             Mean Corpuscular Volume 100.2 fl     High         80.0-96.0     

 

             Mean Corpuscular Hemoglobin 32.4 pg      Normal       27.0-33.0    

 

 

             Mean Corpuscular HGB Conc 32.3 g/dL    Normal       32.0-36.5     

 

             Red Cell Distribution Width 12.9 %       Normal       11.5-14.5    

 

 

             Platelet Count, Automated 330 10       Normal       150-450       

 

             Neutrophils % 69.4 %       High         36.0-66.0     

 

             Lymph %      19.1 %       Low          24.0-44.0     

 

             Mono %       10.2 %       High         0.0-5.0       

 

             Eos %        0.1 %        Normal       0.0-3.0       

 

             Baso %       0.8 %        Normal       0.0-1.0       

 

             Immature Granulocyte % 0.4 %        Normal       0-3.0         

 

             Nucleated Red Blood Cell % 0.0 %        Normal       0-0           

 

             Neutrophils # 5.4 10       Normal       1.5-8.5       

 

             Lymph #      1.5 10       Normal       1.5-5.0       

 

             Mono #       0.8 10       Normal       0.0-0.8       

 

             Eos #        0.0 10       Normal       0.0-0.5       

 

             Baso #       0.1 10       Normal       0.0-0.2       

 

                    Cardiac Marker Panel 2020          [facility]

             CPK Creatine Phosphokinase 109 U/L      Normal               

 

             CK-MB Value Mass 2.5 NG/ML    Normal       <3.6          

 

             MB/CK Relative Index 2.29         Normal       < Or =4      1

 

             Troponin I   < 0.02 NG/ML Normal       < 0.10       2

 

                    Liver Profile       2020          [facility]

             Ast/Sgot     20 U/L       Normal       7-37          

 

             Alt/SGPT     22 U/L       Normal       12-78         

 

             Alkaline Phosphatase 121 U/L      High                 

 

             Bilirubin,Total 0.5 mg/dL    Normal       0.2-1.0       

 

             Bilirubin,Direct 0.2 mg/dL    Normal       0.0-0.2       

 

             Total Protein 7.5 GM/DL    Normal       6.4-8.2       

 

             Albumin      3.7 GM/DL    Normal       3.2-5.2       

 

             Albumin/Globulin Ratio 1.0          Normal                     

 

                    Basic Metabolic Profile 2020          [facility]

             Glucose, Fasting 98 mg/dL     Normal               

 

             Blood Urea Nitrogen 8 mg/dL      Normal       7-18          

 

             Creatinine For GFR 0.76 mg/dL   Normal       0.70-1.30     

 

             Glomerular Filtration Rate > 60.0       Normal       >56          3

 

             Sodium Level 139 mEq/L    Normal       136-145       

 

             Potassium Serum 4.4 mEq/L    Normal       3.5-5.1       

 

             Chloride Level 102 mEq/L    Normal               

 

             Carbon Dioxide Level 33 mEq/L     High         21-32         

 

             Anion Gap    4 mEq/L      Low          8-16          

 

             Calcium Level 9.3 mg/dL    Normal       8.5-10.1      

 

                    Coronavirus 2019 ([facility]) 12/10/2020          [facility]

             Coronavirus 2019 ([facility]) ---------------- <SEE NOTE>                 

           4

 

                    Lab - Unable To Process Specimen 08/10/2020          [facility]

             Lab - Specimen Rejec (SEE NOTE)                             5

 

             Test(s) Ordered FOLIC ACID RBC                             

 

             Rejection Reason QNS                                    6

 

                    Laboratory test finding 08/10/2020          [facility]

l

             Hgba1c       6.0 %                     4.4 - 6.1    7

 

                    CBC W/Automated Diff 08/10/2020          [facility]

             CBC W/Automated Diff (SEE NOTE)                             8

 

             WBC          6.7 10^3/uL               4.2 - 11.0    

 

             RBC          5.00 10^6/uL              4.50 - 6.30   

 

             Hemoglobin   16.6 g/dL    High         14.0 - 16.0   

 

             Hematocrit   49.7 %                    41.0 - 51.0   

 

             MCV          99.4 fL      High         80.0 - 94.0   

 

             MCH          33.2 pg                   27.0 - 34.0   

 

             MCHC         33.4 g/dL                 31.0 - 36.0   

 

             RDW          13.3 %                    11.5 - 14.8   

 

             Platelets    279 10^3/uL               150 - 450     

 

             MPV          8.8 fL                    7.4 - 10.4    

 

             Neut         69.6 %                    37.0 - 80.0   

 

             Lymph        18.4 %       Low          25.0 - 40.0   

 

             Mono         8.8 %        High         3.0 - 8.0     

 

             Eos          2.1 %                     0.0 - 7.0     

 

             Baso         1.0 %                     0.0 - 2.0     

 

             %Ig          0.1 %        High         0.0 - 0.0     

 

             %NRBC        0.0 %                     0.0 - 0.0     

 

             #Neut        4.64 10^3/uL              2.00 - 6.90   

 

             #Lymph       1.23 10^3/uL              0.60 - 3.40   

 

             #Mono        0.59 10^3/uL              0.00 - 0.90   

 

             #Eos         0.14 10^3/uL              0.00 - 0.70   

 

             #Baso        0.07 10^3/uL              0.00 - 0.20   

 

             #Ig          0.01 10^3/uL              0.00 - 0.10   

 

             #NRBC        0.00 10^3/uL              0.00 - 0.00   

 

             Manual Diff  SEE BELOW                               

 

             Segs         71 %                      37 - 80       

 

             %Lymph       18 %         Low          25 - 40       

 

             %Mono        10 %         High         3 - 8         

 

             %Eos         1 %                       0 - 7         

 

             RBC Morph    MORPH IS NORMAL                             

 

                    Comprehensive Metabolic Panel 08/10/2020          [facility] (SEE NOTE)                             9

 

             Sodium       141 mEq/L                 134 - 153     

 

             Potassium    4.5 mEq/L                 3.6 - 5.0     

 

             Chloride     100 mEq/L                 98 - 107      

 

             Co2          28 mEq/L                  22 - 30       

 

             Glucose      97 mg/dL                  65 - 110      

 

             BUN          11 mg/dL                  7 - 21        

 

             Creatinine   0.8 mg/dL                 0.7 - 1.5     

 

             BUN/Creat    14                        8 - 27        

 

             Total Protein 7.3 g/dL                  6.3 - 8.2     

 

             Albumin      4.7 g/dL                  3.9 - 5.0     

 

             Globulin     2.6 GM/DL                 2.4 - 3.2     

 

             A/G Ratio    1.8                       0.8 - 2.0     

 

             Calcium      9.5 mg/dL                 8.4 - 10.2    

 

             Total Bili   0.7 mg/dL                 0.2 - 1.3     

 

             Alkaline Phos 117 U/L                   38 - 126      

 

             Sgot/Ast     27 U/L                    5 - 40        

 

             SGPT/Alt     25 U/L                    7 - 56        

 

             Anion Gap    13.0 mmol/L               8.0 - 16.0    

 

             Age          60 yrs                                  

 

             Non-Aa GFR   >60 mL/min                              

 

             Afr Amer GFR >60 mL/min                             10

 

                    Cve Panel           08/10/2020          [facility]

             Cve Panel    (SEE NOTE)                             11

 

             Cholesterol  202 mg/dL    High         131 - 200     

 

             Triglycerides 88 mg/dL                  35 - 160      

 

             HDL          69 mg/dL                  29 - 86       

 

             LDL          117 mg/dL                 65 - 175      

 

             Risk Factor  2.9          Low          3.4 - 4.9     

 

             LDL/HDL      1.70                      1.00 - 3.55  12

 

                    Laboratory test finding 08/10/2020          [facility]

l

             TSH Highly Sensitive 0.89 uIU/mL               0.47 - 5.01   

 

             Vitamin D (25-Hydroxy) 23 NG/ML                               13

 

             Vitamin B12 Serum 1084 pg/mL                232 - 1245    







                          1                         DIAGNOSIS CRITERIA

MMB ng/ml       Relative Index (RI)

NON-AMI               < or = 5               N/A

GRAY ZONE              > 5                < or = 4

AMI                    > 5                   > 4



 

                          2                         Troponin I Reference Interva

l for Siemens Vista LOCI:



                                        99th Percentile= 0.00-0.045 ng/ml



Risk Stratification:

<= 0.10 ng/ml   Decreased Risk for Adverse Clinical

Events.

                                        0.10-1.50 ng/ml   Increased Risk for Adv

erse Clinical

Events. Evaluation of additional

criterion and/or repeat testing in 2-6

hours is suggested to rule out myocardial

damage.

>= 1.50 ng/ml   Indicative of Myocardial Injury.



 

                          3                         Units are mL/min/1.73 m2



Chronic Kidney Disease Staging per NKF:



Stage I & II   GFR >=60       Normal to Mildly Decreased

Stage III      GFR 30-59      Moderately Decreased

Stage IV       GFR 15-29      Severely Decreased

Stage V        GFR <15        Very Little GFR Left

ESRD           GFR <15 on RRT



 

                          4                         ----------------------------

--------------------------------

Test: COVID-19 Nasal/Naspharynx

Result: NOT DETECTED           Reference Units: Not detected

                                        ----------------------------------------

--------------------

Note: Please consider re-collection of a new specimen, if

clinically indicated.



Note: The COVID-19 assay is under Emergency Use

Authorization(EUA) by the U.S. Food and Drug Administration.



"[employer] is designated as a high complexity

laboratory by the Clinical Laboratory Improvement Amendments

of 1988(CLIA) and is qualified to perform this test.

ASSAY INFORMATION: Real Time RT-PCR





 

                          5                         Specimen Integrity/Specimen 

Recollection

The patient sample needs to be resubmitted for the following reason:



 

                          6                         {          One or more of th

e tests you ordered cannot be performed.

{          Please recollect, reorder, and resubmit if needed.



 

                          7                         {A1]

{HB]



 

                          8                         COMPLETE BLOOD COUNT



 

                          9                         COMPREHENSIVE METABOLIC PANE

L



 

                          10                        Male GFR Interprentation

                                        20-49 yrs     >60 mL/min   Normal

                                        50-59 yrs     >56 mL/min   Normal

                                        60-69 yrs     >49 mL/min   Normal

                                        70-79yrs      >42 mL/min   Normal

                                        80 and above  >35 mL/min   Normal

Female GFR  Interpretation

                                        20-39 yrs     >60 mL/min   Normal

                                        40-49 yrs     >58 mL/min   Normal

                                        50-59 yrs     >51 mL/min   Normal

                                        60-69 yrs     >45 mL/min   Normal

                                        70-79 yrs     >39 mL/min   Normal

                                        80 and above  >32 mL/min   Normal



 

                          11                        LIPID PANEL



 

                          12                        CVE

RISK           CHOL/HDL       LDL/HDL

MEN:

                                        1/2  AVERAGE          3.43          1.00

AVERAGE          4.97          3.55

                                        2X   AVERAGE          9.55          6.25

                                        3X   AVERAGE         23.99          7.99

WOMEN:

                                        1/2  AVERAGE          3.27          1.47

AVERAGE          4.44          3.22

                                        2X   AVERAGE          7.05          5.03

                                        3X   AVERAGE         11.04          6.14



 

                          13                        VITAMIN-D(25HYDROXY)

Deficiency: <=20 ng/ml

Insufficiency: 21-29 ng/ml

Preferred level: => 30 ng/ml









Procedures





                                        Description

 

                                        No Information Available







Medical Devices





                                        Description

 

                                        No Information Available







Encounters





                                        Description

 

                                        No Information Available







Assessments





                Date            Code            Description     Provider

 

                2020      G47.33          Obstructive sleep apnea (adult) 

(pediatric) Daisy Morales PA-C

 

                2020      J44.9           Chronic obstructive pulmonary di

sease, unspecified Daisy Morales PA-C

 

                2020      K21.9           Gastro-esophageal reflux disease

 without esophagitis Daisy Morales PA-C

 

                2020      B37.0           Candidal stomatitis Daisy Morales PA-C

 

                    08/10/2020          Z00.01              Encounter for genera

l adult medical examination with abnormal 

findings                                Daisy Morales PA-C

 

                08/10/2020      R14.0           Abdominal distension (gaseous) E

jennifer Morales PA-C

 

                08/10/2020      G47.33          Obstructive sleep apnea (adult) 

(pediatric) Daisy Morales PA-C

 

                08/10/2020      R23.3           Spontaneous ecchymoses Daisy rabago PA-C

 

                08/10/2020      J44.9           Chronic obstructive pulmonary di

sease, unspecified Daisy Morales PA-C







Plan of Treatment

Future Appointment(s):* 02/15/2021  9:00 am - Daisy Morales PA-C at [facility]





Functional Status





                                        Description

 

                                        No Information Available







Mental Status





                                        Description

 

                                        No Information Available







Referrals





                Refer to      Reason for Referral Status          Appt Date

 

                    [facility]         60 year old male with BPH. Please evalua

te and treat. Thank you.  

Patient Notified                        2020

 

                                        [address] [facility] [address]

 

                                        [place] [address]

 

                                        [phone]

 

                                          

 

                          Solitario Latif MD          60 year old male with histor

y of weight loss, abdominal 

discomfort, reflux, excessive bloating, and gas. History of colonic polyps. Last
endoscopy/colonoscopy at [facility] 2016. Please evaluate and treat.  Patient 

Notified                                2021

 

                                        [address] [address]

 

                                        [phone]

## 2021-02-01 ENCOUNTER — HOSPITAL ENCOUNTER (OUTPATIENT)
Dept: HOSPITAL 53 - M LAB | Age: 61
End: 2021-02-01
Attending: NURSE PRACTITIONER
Payer: COMMERCIAL

## 2021-02-01 DIAGNOSIS — Z12.5: Primary | ICD-10-CM

## 2021-02-01 PROCEDURE — 36415 COLL VENOUS BLD VENIPUNCTURE: CPT

## 2021-02-07 ENCOUNTER — HOSPITAL ENCOUNTER (OUTPATIENT)
Dept: HOSPITAL 53 - M LABSMTC | Age: 61
End: 2021-02-07
Attending: ANESTHESIOLOGY
Payer: COMMERCIAL

## 2021-02-07 DIAGNOSIS — Z01.812: Primary | ICD-10-CM

## 2021-02-07 DIAGNOSIS — Z20.822: ICD-10-CM

## 2021-02-12 ENCOUNTER — HOSPITAL ENCOUNTER (OUTPATIENT)
Dept: HOSPITAL 53 - M OPP | Age: 61
Discharge: HOME | End: 2021-02-12
Attending: INTERNAL MEDICINE
Payer: COMMERCIAL

## 2021-02-12 VITALS — DIASTOLIC BLOOD PRESSURE: 58 MMHG | SYSTOLIC BLOOD PRESSURE: 99 MMHG

## 2021-02-12 VITALS — HEIGHT: 64 IN | BODY MASS INDEX: 17.93 KG/M2 | WEIGHT: 105 LBS

## 2021-02-12 DIAGNOSIS — Q43.8: ICD-10-CM

## 2021-02-12 DIAGNOSIS — J44.9: ICD-10-CM

## 2021-02-12 DIAGNOSIS — D12.6: ICD-10-CM

## 2021-02-12 DIAGNOSIS — Z79.82: ICD-10-CM

## 2021-02-12 DIAGNOSIS — K64.8: ICD-10-CM

## 2021-02-12 DIAGNOSIS — G47.30: ICD-10-CM

## 2021-02-12 DIAGNOSIS — F41.9: ICD-10-CM

## 2021-02-12 DIAGNOSIS — D13.1: ICD-10-CM

## 2021-02-12 DIAGNOSIS — R19.4: ICD-10-CM

## 2021-02-12 DIAGNOSIS — K62.1: ICD-10-CM

## 2021-02-12 DIAGNOSIS — Z79.899: ICD-10-CM

## 2021-02-12 DIAGNOSIS — F32.9: ICD-10-CM

## 2021-02-12 DIAGNOSIS — Z79.51: ICD-10-CM

## 2021-02-12 DIAGNOSIS — Z12.11: Primary | ICD-10-CM

## 2021-02-12 DIAGNOSIS — Z86.711: ICD-10-CM

## 2021-02-12 DIAGNOSIS — F17.200: ICD-10-CM

## 2021-02-12 DIAGNOSIS — R10.13: ICD-10-CM

## 2021-02-12 DIAGNOSIS — D13.39: ICD-10-CM

## 2021-02-12 DIAGNOSIS — R13.10: ICD-10-CM

## 2021-02-12 DIAGNOSIS — K21.9: ICD-10-CM

## 2021-02-12 DIAGNOSIS — K29.70: ICD-10-CM

## 2021-02-12 PROCEDURE — 43239 EGD BIOPSY SINGLE/MULTIPLE: CPT

## 2021-02-12 PROCEDURE — 45385 COLONOSCOPY W/LESION REMOVAL: CPT

## 2021-02-12 PROCEDURE — 88305 TISSUE EXAM BY PATHOLOGIST: CPT

## 2021-02-12 PROCEDURE — 45380 COLONOSCOPY AND BIOPSY: CPT

## 2021-02-12 NOTE — CCD
Continuity of Care Document (CCD)

                             Created on: 2021



Neftaly Guillory

External Reference #: [id number]

: 1960

Sex: Male



Demographics





                          Address                   [address] [place] [address]

 

                          Home Phone                [phone]

 

                          Preferred Language        Unknown

 

                          Marital Status            

 

                          Scientologist Affiliation     Unknown

 

                          Race                      White

 

                          Ethnic Group              Not  or 





Author





                          Author                    Neftaly MORALES PA-C

 

                          Organization              Unknown

 

                          Address                   [address] [place] [address]



 

                          Phone                     [phone]







Care Team Providers





                    Care Team Member Name Role                Phone

 

                    [facility] AUTM                [phone]

 

                    [facility] AUTM                [phone]

 

                    Daisy Morales PA-C  AUTM                [phone]

 

                    Sharath Castillo MD AUTM                [phone]

 

                    Solitario Latif MD    AUTM                [phone]

 

                    [facility]         AUTM                [phone]







Problems





                    Active Problems     Provider            Date

 

                    Obstructive sleep apnea syndrome JUAN ANTONIO Taylor  Onset: 

2019

 

                    Tobacco user        JUAN ANTONIO Taylor  Onset: 2019

 

                    Chronic obstructive lung disease JUAN ANTONIO Taylor  Onset: 

2019







Social History





                Type            Date            Description     Comments

 

                Birth Sex                       Unknown          

 

                Tobacco Use     Start: Unknown  Current Cigar Smoker 1 Daily  

 

                ETOH Use                        Denies alcohol use  

 

                Recreational Drug Use                 Denies Drug Use  

 

                Tobacco Use     Start: Unknown End: Unknown Patient is a former 

smoker quit  

 

                Smoking Status  Reviewed: 19 Patient is a former smoker qu

it  







Allergies, Adverse Reactions, Alerts





             Active Allergies Reaction     Severity     Comments     Date

 

             NKDA                                                2019

 

             NKFA                                                2019

 

             [employer]                                                2019







Medications





           Active Medications SIG        Qnty       Indications Ordering Provide

r Date

 

                          Pepcid                     20mg Tablets               

    1 by mouth twice a day

as needed gerd  180tabs                         Toro Ny MD 2020

 

                                        Vitamin D3 Ultra Potency                

     1.25mg (94568 Ut) Tablets          

             1 tab by mouth every week 12tabs                    Toro Ny MD 2020

 

           Patient Unable To Tolerate Cpap, Please Evaluate                     

             Toro Ny MD 

2019

 

                          Pantoprazole Sodium                     40mg Tablets D

R                   1 by 

mouth every day 30tabs                          Toro Ny MD 2019

 

                          Nebulizer Kit/Tubing/Mouthpiece                      K

it                   use 

with neb tx every 4-6 hours as needed. dx j44.1 4units                          

Toro Ny MD 

2019

 

                          Nebulizer                      Device                 

  use every 4-6 hours as 

needed for wheezing and/or shortness of breath 1units          J44.9           H

arari Ny MD 

2019

 

                          Simvastatin                     20mg Tablets          

         take 1 tablet by 

mouth every day 90tabs          E78.5           Toro Ny MD 2019

 

                          Tamsulosin HCL                     0.4mg Capsules     

              1 tab by 

mouth every day 90caps                          Toro Ny MD 

 

           Apple Cider Vinegar Tablets                                  Unknown 

   

 

                          Levalbuterol Tartrate                     45mcg/Act Ae

rosol                   

Inl 2 PFS PO Q 4 H prn                                 Unknown         

0

 

                          Stiolto Respimat                     2.5-2.5mcg/Act Ae

rosol                   

Inl 2 PFS PO qd                                 Unknown         

 

                          Budesonide                     1mg/2ML Suspension     

              Inhale 

Contents Of 2 Vials Via Nebulizer Twice A Day                                 Un

known         

 

                          Aspirin 81                     81mg Tablets DR        

           1 by mouth 

every day                                       Unknown         

 

           Clotrimazole                     10mg Zack                         

                           Unknown    



 

                          Sucralfate                     1gm Tablets            

       Take 1 Tablet By 

Mouth Four Times Daily For 4 Weeks 360tabs                         Toro doherty MD 







Immunizations





                                        Description

 

                                        No Information Available







Vital Signs





                Date            Vital           Result          Comment

 

                2020  9:27am BP Systolic     122 mmHg         

 

                    BP Diastolic        80 mmHg              

 

                    Heart Rate          100 /min             

 

                    Body Temperature    97.1 F             

 

                    Respiratory Rate    16 /min              

 

                    O2 % BldC Oximetry  96 %                 

 

                    Weight              113.25 lb            

 

                    Weight              51.370 kg            

 

                    Height              64 inches           5'4"

 

                    BMI (Body Mass Index) 19.4 kg/m2           

 

                    BSA (Body Surface Area) 1.54 m2              

 

                08/10/2020  8:54am BP Systolic     112 mmHg         

 

                    BP Diastolic        64 mmHg              

 

                    Heart Rate          90 /min              

 

                    Body Temperature    97.2 F             

 

                    Respiratory Rate    16 /min              

 

                    O2 % BldC Oximetry  95 %                 

 

                    Weight              106.38 lb            

 

                    Weight              48.252 kg            

 

                    Height              64 inches           5'4"

 

                    BMI (Body Mass Index) 18.3 kg/m2           

 

                    BSA (Body Surface Area) 1.50 m2              







Results





        Test    Acquired Date Facility Test    Result  H/L     Range   Note

 

                    Laboratory test finding 2021          [facility]

             PSA Screening 1.04 NG/ML   Normal       < 4.00       1

 

                    CBC With Differential 2020          [facility]

             White Blood Count 7.8 10       Normal       4.0-10.0      

 

             Red Blood Count 4.79 10      Normal       4.30-6.10     

 

             Hemoglobin   15.5 g/dL    Normal       13.5-17.5     

 

             Hematocrit   48.0 %       Normal       42.0-52.0     

 

             Mean Corpuscular Volume 100.2 fl     High         80.0-96.0     

 

             Mean Corpuscular Hemoglobin 32.4 pg      Normal       27.0-33.0    

 

 

             Mean Corpuscular HGB Conc 32.3 g/dL    Normal       32.0-36.5     

 

             Red Cell Distribution Width 12.9 %       Normal       11.5-14.5    

 

 

             Platelet Count, Automated 330 10       Normal       150-450       

 

             Neutrophils % 69.4 %       High         36.0-66.0     

 

             Lymph %      19.1 %       Low          24.0-44.0     

 

             Mono %       10.2 %       High         0.0-5.0       

 

             Eos %        0.1 %        Normal       0.0-3.0       

 

             Baso %       0.8 %        Normal       0.0-1.0       

 

             Immature Granulocyte % 0.4 %        Normal       0-3.0         

 

             Nucleated Red Blood Cell % 0.0 %        Normal       0-0           

 

             Neutrophils # 5.4 10       Normal       1.5-8.5       

 

             Lymph #      1.5 10       Normal       1.5-5.0       

 

             Mono #       0.8 10       Normal       0.0-0.8       

 

             Eos #        0.0 10       Normal       0.0-0.5       

 

             Baso #       0.1 10       Normal       0.0-0.2       

 

                    Cardiac Marker Panel 2020          [facility]

             CPK Creatine Phosphokinase 109 U/L      Normal               

 

             CK-MB Value Mass 2.5 NG/ML    Normal       <3.6          

 

             MB/CK Relative Index 2.29         Normal       < Or =4      2

 

             Troponin I   < 0.02 NG/ML Normal       < 0.10       3

 

                    Liver Profile       2020          [facility]

             Ast/Sgot     20 U/L       Normal       7-37          

 

             Alt/SGPT     22 U/L       Normal       12-78         

 

             Alkaline Phosphatase 121 U/L      High                 

 

             Bilirubin,Total 0.5 mg/dL    Normal       0.2-1.0       

 

             Bilirubin,Direct 0.2 mg/dL    Normal       0.0-0.2       

 

             Total Protein 7.5 GM/DL    Normal       6.4-8.2       

 

             Albumin      3.7 GM/DL    Normal       3.2-5.2       

 

             Albumin/Globulin Ratio 1.0          Normal                     

 

                    Basic Metabolic Profile 2020          [facility]

             Glucose, Fasting 98 mg/dL     Normal               

 

             Blood Urea Nitrogen 8 mg/dL      Normal       7-18          

 

             Creatinine For GFR 0.76 mg/dL   Normal       0.70-1.30     

 

             Glomerular Filtration Rate > 60.0       Normal       >56          4

 

             Sodium Level 139 mEq/L    Normal       136-145       

 

             Potassium Serum 4.4 mEq/L    Normal       3.5-5.1       

 

             Chloride Level 102 mEq/L    Normal               

 

             Carbon Dioxide Level 33 mEq/L     High         21-32         

 

             Anion Gap    4 mEq/L      Low          8-16          

 

             Calcium Level 9.3 mg/dL    Normal       8.5-10.1      

 

                    Coronavirus 2019 ([facility]) 12/10/2020          [facility]

             Coronavirus 2019 ([facility]) ---------------- <SEE NOTE>                 

           5

 

                    Lab - Unable To Process Specimen 08/10/2020          [facility]

             Lab - Specimen Rejec (SEE NOTE)                             6

 

             Test(s) Ordered FOLIC ACID RBC                             

 

             Rejection Reason QNS                                    7

 

                    Laboratory test finding 08/10/2020          [facility]

l

             Hgba1c       6.0 %                     4.4 - 6.1    8

 

                    CBC W/Automated Diff 08/10/2020          [facility]

             CBC W/Automated Diff (SEE NOTE)                             9

 

             WBC          6.7 10^3/uL               4.2 - 11.0    

 

             RBC          5.00 10^6/uL              4.50 - 6.30   

 

             Hemoglobin   16.6 g/dL    High         14.0 - 16.0   

 

             Hematocrit   49.7 %                    41.0 - 51.0   

 

             MCV          99.4 fL      High         80.0 - 94.0   

 

             MCH          33.2 pg                   27.0 - 34.0   

 

             MCHC         33.4 g/dL                 31.0 - 36.0   

 

             RDW          13.3 %                    11.5 - 14.8   

 

             Platelets    279 10^3/uL               150 - 450     

 

             MPV          8.8 fL                    7.4 - 10.4    

 

             Neut         69.6 %                    37.0 - 80.0   

 

             Lymph        18.4 %       Low          25.0 - 40.0   

 

             Mono         8.8 %        High         3.0 - 8.0     

 

             Eos          2.1 %                     0.0 - 7.0     

 

             Baso         1.0 %                     0.0 - 2.0     

 

             %Ig          0.1 %        High         0.0 - 0.0     

 

             %NRBC        0.0 %                     0.0 - 0.0     

 

             #Neut        4.64 10^3/uL              2.00 - 6.90   

 

             #Lymph       1.23 10^3/uL              0.60 - 3.40   

 

             #Mono        0.59 10^3/uL              0.00 - 0.90   

 

             #Eos         0.14 10^3/uL              0.00 - 0.70   

 

             #Baso        0.07 10^3/uL              0.00 - 0.20   

 

             #Ig          0.01 10^3/uL              0.00 - 0.10   

 

             #NRBC        0.00 10^3/uL              0.00 - 0.00   

 

             Manual Diff  SEE BELOW                               

 

             Segs         71 %                      37 - 80       

 

             %Lymph       18 %         Low          25 - 40       

 

             %Mono        10 %         High         3 - 8         

 

             %Eos         1 %                       0 - 7         

 

             RBC Morph    MORPH IS NORMAL                             

 

                    Comprehensive Metabolic Panel 08/10/2020          [facility]

             Comprehensive Metabo (SEE NOTE)                             10

 

             Sodium       141 mEq/L                 134 - 153     

 

             Potassium    4.5 mEq/L                 3.6 - 5.0     

 

             Chloride     100 mEq/L                 98 - 107      

 

             Co2          28 mEq/L                  22 - 30       

 

             Glucose      97 mg/dL                  65 - 110      

 

             BUN          11 mg/dL                  7 - 21        

 

             Creatinine   0.8 mg/dL                 0.7 - 1.5     

 

             BUN/Creat    14                        8 - 27        

 

             Total Protein 7.3 g/dL                  6.3 - 8.2     

 

             Albumin      4.7 g/dL                  3.9 - 5.0     

 

             Globulin     2.6 GM/DL                 2.4 - 3.2     

 

             A/G Ratio    1.8                       0.8 - 2.0     

 

             Calcium      9.5 mg/dL                 8.4 - 10.2    

 

             Total Bili   0.7 mg/dL                 0.2 - 1.3     

 

             Alkaline Phos 117 U/L                   38 - 126      

 

             Sgot/Ast     27 U/L                    5 - 40        

 

             SGPT/Alt     25 U/L                    7 - 56        

 

             Anion Gap    13.0 mmol/L               8.0 - 16.0    

 

             Age          60 yrs                                  

 

             Non-Aa GFR   >60 mL/min                              

 

             Afr Amer GFR >60 mL/min                             11

 

                    Cve Panel           08/10/2020          [facility]

             Cve Panel    (SEE NOTE)                             12

 

             Cholesterol  202 mg/dL    High         131 - 200     

 

             Triglycerides 88 mg/dL                  35 - 160      

 

             HDL          69 mg/dL                  29 - 86       

 

             LDL          117 mg/dL                 65 - 175      

 

             Risk Factor  2.9          Low          3.4 - 4.9     

 

             LDL/HDL      1.70                      1.00 - 3.55  13

 

                    Laboratory test finding 08/10/2020          [facility]

l

             TSH Highly Sensitive 0.89 uIU/mL               0.47 - 5.01   

 

             Vitamin D (25-Hydroxy) 23 NG/ML                               14

 

             Vitamin B12 Serum 1084 pg/mL                232 - 1245    







                          1                         The PSA assay is performed o

n the Siemens Vista analyzer by

LOCI sandwich chemiluminescent immunoassay and should not be

compared interchangeably with other methods.  It should not

be used alone as a screening test or diagnosis for the

presence or absence of malignant disease.  Predictions of

disease recurrence should not be based solely on values

obtained from serial patient serum values.



 

                          2                         DIAGNOSIS CRITERIA

MMB ng/ml       Relative Index (RI)

NON-AMI               < or = 5               N/A

GRAY ZONE              > 5                < or = 4

AMI                    > 5                   > 4



 

                          3                         Troponin I Reference Interva

l for Siemens Vista LOCI:



                                        99th Percentile= 0.00-0.045 ng/ml



Risk Stratification:

<= 0.10 ng/ml   Decreased Risk for Adverse Clinical

Events.

                                        0.10-1.50 ng/ml   Increased Risk for Adv

erse Clinical

Events. Evaluation of additional

criterion and/or repeat testing in 2-6

hours is suggested to rule out myocardial

damage.

>= 1.50 ng/ml   Indicative of Myocardial Injury.



 

                          4                         Units are mL/min/1.73 m2



Chronic Kidney Disease Staging per NKF:



Stage I & II   GFR >=60       Normal to Mildly Decreased

Stage III      GFR 30-59      Moderately Decreased

Stage IV       GFR 15-29      Severely Decreased

Stage V        GFR <15        Very Little GFR Left

ESRD           GFR <15 on RRT



 

                          5                         ----------------------------

--------------------------------

Test: COVID-19 Nasal/Naspharynx

Result: NOT DETECTED           Reference Units: Not detected

                                        ----------------------------------------

--------------------

Note: Please consider re-collection of a new specimen, if

clinically indicated.



Note: The COVID-19 assay is under Emergency Use

Authorization(EUA) by the U.S. Food and Drug Administration.



[facility] is designated as a high complexity

laboratory by the Clinical Laboratory Improvement Amendments

of 1988(CLIA) and is qualified to perform this test.

ASSAY INFORMATION: Real Time RT-PCR





 

                          6                         Specimen Integrity/Specimen 

Recollection

The patient sample needs to be resubmitted for the following reason:



 

                          7                         {          One or more of th

e tests you ordered cannot be performed.

{          Please recollect, reorder, and resubmit if needed.



 

                          8                         {A1]

{HB]



 

                          9                         COMPLETE BLOOD COUNT



 

                          10                        COMPREHENSIVE METABOLIC PANE

L



 

                          11                        Male GFR Interprentation

                                        20-49 yrs     >60 mL/min   Normal

                                        50-59 yrs     >56 mL/min   Normal

                                        60-69 yrs     >49 mL/min   Normal

                                        70-79yrs      >42 mL/min   Normal

                                        80 and above  >35 mL/min   Normal

Female GFR  Interpretation

                                        20-39 yrs     >60 mL/min   Normal

                                        40-49 yrs     >58 mL/min   Normal

                                        50-59 yrs     >51 mL/min   Normal

                                        60-69 yrs     >45 mL/min   Normal

                                        70-79 yrs     >39 mL/min   Normal

                                        80 and above  >32 mL/min   Normal



 

                          12                        LIPID PANEL



 

                          13                        CVE

RISK           CHOL/HDL       LDL/HDL

MEN:

                                        1/2  AVERAGE          3.43          1.00

AVERAGE          4.97          3.55

                                        2X   AVERAGE          9.55          6.25

                                        3X   AVERAGE         23.99          7.99

WOMEN:

                                        1/2  AVERAGE          3.27          1.47

AVERAGE          4.44          3.22

                                        2X   AVERAGE          7.05          5.03

                                        3X   AVERAGE         11.04          6.14



 

                          14                        VITAMIN-D(25HYDROXY)

Deficiency: <=20 ng/ml

Insufficiency: 21-29 ng/ml

Preferred level: => 30 ng/ml









Procedures





                                        Description

 

                                        No Information Available







Medical Devices





                                        Description

 

                                        No Information Available







Encounters





                                        Description

 

                                        No Information Available







Assessments





                Date            Code            Description     Provider

 

                2020      G47.33          Obstructive sleep apnea (adult) 

(pediatric) Daisy Morales PA-C

 

                2020      J44.9           Chronic obstructive pulmonary di

sease, unspecified Daisy Morales PA-C

 

                2020      K21.9           Gastro-esophageal reflux disease

 without esophagitis Daisy Morales PA-C

 

                2020      B37.0           Candidal stomatitis Daisy Morales PA-C

 

                    08/10/2020          Z00.01              Encounter for genera

l adult medical examination with abnormal 

findings                                Daisy Morales PA-C

 

                08/10/2020      R14.0           Abdominal distension (gaseous) E

jenniferjonah Morales PA-C

 

                08/10/2020      G47.33          Obstructive sleep apnea (adult) 

(pediatric) Daisy Morales PA-C

 

                08/10/2020      R23.3           Spontaneous ecchymoses Daisy rabago PA-C

 

                08/10/2020      J44.9           Chronic obstructive pulmonary di

sease, unspecified Daisy Morales PA-C







Plan of Treatment

Future Appointment(s):* 02/15/2021  9:00 am - Daisy Morales PA-C at [facility]





Functional Status





                                        Description

 

                                        No Information Available







Mental Status





                                        Description

 

                                        No Information Available







Referrals





                Refer to Dr     Reason for Referral Status          Appt Date

 

                    [facility]         60 year old male with BPH. Please evalua

te and treat. Thank you.  

Patient Notified                        2020

 

                                        [id number] [facility] [address]

 

                                        [place] [address]

 

                                        [phone]

 

                                          

 

                          Solitario Latif MD          60 year old male with histor

y of weight loss, abdominal 

discomfort, reflux, excessive bloating, and gas. History of colonic polyps. Last
endoscopy/colonoscopy at [facility] 2016. Please evaluate and treat.  Closed     

               

2021

 

                                        [address] [address]

 

                                        [phone]

## 2021-02-12 NOTE — CCD
Summarization Of Episode

                             Created on: 2021



LORI MARX

External Reference #: [id number]

: 1960

Sex: Male



Demographics





                          Address                   [address]

[place]  [address]

 

                          Home Phone                [phone]

 

                          Preferred Language        Unknown

 

                          Marital Status            

 

                          Latter day Affiliation     NONE

 

                          Race                      White

 

                          Ethnic Group              Not  or 





Author





                          Author                    [facility]

 

                          Organization              [facility]

 

                          Address                   Unknown

 

                          Phone                     Unavailable







Support





                Name            Relationship    Address         Phone

 

                [facility]        [place] [address] Unavailable

 

                    NONE, PT PER        Next Of Kin         -

                                        -

                          -, -  -                   -

 

                    [facility]      [place]  [address]                    [phone]

 

                    [facility] [place]  [address]                    [phone]

 

                    DAVID MARX         Next Of Derek         [address]

[place]  [address]                     [phone]

 

                    David Marx         ECON                [address]

[place]  [address]                     Unavailable







Care Team Providers





                    Care Team Member Name Role                Phone

 

                    TABBY Olivares Unavailable         Unavailable

 

                    Mollura, TABBY Kaur PA Unavailable         Unavailable

 

                    Mollura, TABBY Kaur PA Unavailable         Unavailable

 

                    Mollura, TABBY Kaur PA Unavailable         Unavailable

 

                    Mollura, TABBY Kaur PA Unavailable         Unavailable

 

                    Mollura, TABBY Kaur PA Unavailable         Unavailable

 

                    Mollura, TABBY Kaur PA Unavailable         Unavailable

 

                    Mollura, E Natasha PA Unavailable         Unavailable

 

                    Mollura, E Natasha PA Unavailable         Unavailable

 

                    Mollura, E Natasha PA Unavailable         Unavailable

 

                    Mollura, E Natasha PA Unavailable         Unavailable

 

                    Mollura, E Natasha PA Unavailable         Unavailable

 

                    Mollura, TABBY Kaur PA Unavailable         Unavailable

 

                    MolluraTABBY PA Unavailable         Unavailable

 

                    Mollura E Natasha PA Unavailable         Unavailable

 

                    Mollura, E Natasha PA Unavailable         Unavailable

 

                    Mollura, E Natasha PA Unavailable         Unavailable

 

                    Mollura, E Natasha PA Unavailable         Unavailable

 

                    Mollura, E Natasha PA Unavailable         Unavailable

 

                    Mollura, E Natasha PA Unavailable         Unavailable

 

                    Mollura, E Natasha PA Unavailable         Unavailable

 

                    Mollura, E Natasha PA Unavailable         Unavailable

 

                    Mollura, E Natasha PA Unavailable         Unavailable

 

                    Mollura, E Natasha PA Unavailable         Unavailable

 

                    Mollura, E Natasha PA Unavailable         Unavailable

 

                    Mollura, E Natasha PA Unavailable         Unavailable

 

                    Mollura, E Natasha PA Unavailable         Unavailable

 

                    Mollura, E Natasha PA Unavailable         Unavailable

 

                    Mollura, E Natsaha PA Unavailable         Unavailable

 

                    Mollura, E Natasha PA Unavailable         Unavailable

 

                    Mollura, E Natasha PA Unavailable         Unavailable

 

                    Mollura, E Natasha PA Unavailable         Unavailable

 

                    Mollura, E Natasha PA Unavailable         Unavailable

 

                    Mollura, E Natasha PA Unavailable         Unavailable

 

                    Mollura, E Natasha PA Unavailable         Unavailable

 

                    Mollura, E Natasha PA Unavailable         Unavailable

 

                    Nicole Nath MD  Unavailable         Unavailable

 

                    Nicole Nath MD  Unavailable         Unavailable

 

                    Nicole Nath MD  Unavailable         Unavailable

 

                    Nicole Nath MD  Unavailable         Unavailable

 

                    Nicole Nath MD  Unavailable         Unavailable

 

                    Nicole Nath MD  Unavailable         Unavailable

 

                    Nicole Nath MD  Unavailable         Unavailable

 

                    Nicole Nath MD  Unavailable         Unavailable

 

                    Nicole Nath MD  Unavailable         Unavailable

 

                    Nicole Nath MD  Unavailable         Unavailable

 

                    Nicole Nath MD  Unavailable         Unavailable

 

                    Nicole Nath MD  Unavailable         Unavailable

 

                    Nicole Nath MD  Unavailable         Unavailable

 

                    Nicole Nath MD  Unavailable         Unavailable

 

                    Nicole Nath MD  Unavailable         Unavailable

 

                    Nicole Nath MD  Unavailable         Unavailable

 

                    Nicole Nath MD  Unavailable         Unavailable

 

                    Nicole Nath MD  Unavailable         Unavailable

 

                    Nicole Nath MD  Unavailable         Unavailable

 

                    Nicole Nath MD  Unavailable         Unavailable

 

                    Nicole Nath MD  Unavailable         Unavailable

 

                    Nicole Nath MD  Unavailable         Unavailable

 

                    Nicole Nath MD  Unavailable         Unavailable

 

                    Nicole Nath MD  Unavailable         Unavailable

 

                    Nicole Nath MD  Unavailable         Unavailable

 

                    Braun, M Daisy PA-C Unavailable         Unavailable

 

                    Braun, M Daisy PA-C Unavailable         Unavailable

 

                    Braun, M Daisy PA-C Unavailable         Unavailable

 

                    Braun, M Daisy PA-C Unavailable         Unavailable

 

                    Braun, M Daisy PA-C Unavailable         Unavailable

 

                    Braun, M Daisy PA-C Unavailable         Unavailable

 

                    Braun, M Daisy PA-C Unavailable         Unavailable

 

                    Braun, M Daisy PA-C Unavailable         Unavailable

 

                    Braun, M Daisy PA-C Unavailable         Unavailable

 

                    Braun, M Daisy PA-C Unavailable         Unavailable

 

                    Braun, M Daisy PA-C Unavailable         Unavailable

 

                    Braun, M Daisy PA-C Unavailable         Unavailable

 

                    Braun, M Daisy PA-C Unavailable         Unavailable

 

                    Braun, M Daisy PA-C Unavailable         Unavailable

 

                    Braun, M Daisy PA-C Unavailable         Unavailable

 

                    Braun, M Daisy PA-C Unavailable         Unavailable

 

                    Braun, M Daisy PA-C Unavailable         Unavailable

 

                    Braun, M Daisy PA-C Unavailable         Unavailable

 

                    Braun, M Daisy PA-C Unavailable         Unavailable

 

                    Braun, M Daisy PA-C Unavailable         Unavailable

 

                    Braun, M Daisy PA-C Unavailable         Unavailable

 

                    Braun, M Daisy PA-C Unavailable         Unavailable

 

                    Braun, M Daisy PA-C Unavailable         Unavailable

 

                    Braun, M Daisy PA-C Unavailable         Unavailable

 

                    Braun, M Daisy PA-C Unavailable         Unavailable

 

                    Braun, M Daisy PA-C Unavailable         Unavailable

 

                    Braun, M Daisy PA-C Unavailable         Unavailable

 

                    Braun, M Daisy PA-C Unavailable         Unavailable

 

                    Braun, M Daisy PA-C Unavailable         Unavailable

 

                    Braun, M Daisy PA-C Unavailable         Unavailable

 

                    Braun, M Daisy PA-C Unavailable         Unavailable

 

                    Braun, M Daisy PA-C Unavailable         Unavailable

 

                    Braun, M Daisy PA-C Unavailable         Unavailable



                                  



Re-disclosure Warning

          The records that you are about to access may contain information from 
federally-assisted alcohol or drug abuse programs. If such information is 
present, then the following federally mandated warning applies: This information
has been disclosed to you from records protected by federal confidentiality 
rules (42 CFR part 2). The federal rules prohibit you from making any further 
disclosure of this information unless further disclosure is expressly permitted 
by the written consent of the person to whom it pertains or as otherwise 
permitted by 42 CFR part 2. A general authorization for the release of medical 
or other information is NOT sufficient for this purpose. The Federal rules 
restrict any use of the information to criminally investigate or prosecute any 
alcohol or drug abuse patient.The records that you are about to access may 
contain highly sensitive health information, the redisclosure of which is 
protected by Article 27-F of the [facility] Public Health law. If you 
continue you may have access to information: Regarding HIV / AIDS; Provided by 
facilities licensed or operated by the [facility] Office of Mental Health; 
or Provided by the [facility] Office for People With Developmental 
Disabilities. If such information is present, then the following [place] 
mandated warning applies: This information has been disclosed to you from 
confidential records which are protected by state law. State law prohibits you 
from making any further disclosure of this information without the specific 
written consent of the person to whom it pertains, or as otherwise permitted by 
law. Any unauthorized further disclosure in violation of state law may result in
a fine or snf sentence or both. A general authorization for the release of 
medical or other information is NOT sufficient authorization for further disc
losure.                                                                         
    



Family History

          



             Family Member Name Family Member Gender Family Member Status Date o

f Status 

Description                             Data Source(s)

 

           Unknown    Male       Problem                          [facility] ([facility]

 

           Unknown    Male       Problem                          [facility]

 

           Unknown    Unknown    Problem                          [facility] ([facility]

 

           Unknown    Female     Problem                          [facility] ([facility])



                                                                                
                                     



Encounters

          



           Encounter  Providers  Location   Date       Indications Data Source(s

)

 

                Unknown                         [address]

[place] [address] 2021 12:00:00 AM 

EST                                                 eCW1 ([facility])

 

                Unknown                         [address]

[place] [address] 2021 12:00:00 AM 

EST                                                 eCW1 ([facility])

 

                Outpatient                      [address]

[place] [address] 2020 12:00:00 AM

EST                                                 eCW1 ([facility])

 

                Outpatient      Attender: Daisy ROMANO-CConsultant: Natasha ROMANO                 2020

08:53:00 AM EST - 2020 08:53:00 AM EST                           [facility]

 

                Outpatient      Attender: Nicole Green/Aracelis/Juan Daniel/Blayne river 2020 

02:30:00 PM EST                                     [facility] ([facility]

 

                Outpatient      Attender: Daisy MINAYACConsultant: Natasha ROMANO                 08/10/2020

08:40:00 AM EDT - 08/10/2020 08:40:00 AM EDT                           [facility]

 

                Outpatient      Attender: Nicole Green/Radha/Blayne river 2020 

01:00:00 PM EDT                                     [facility] ([facility]



                                                                                
                                                                   



Medications

          



          Medication Brand Name Start Date Product Form Dose      Route     Admi

nistrative 

Instructions Pharmacy Instructions Status     Indications Reaction   Description

 Data 

Source(s)

 

                                        Sulfamethoxazole 800 MG / Trimethoprim 1

60 MG Oral Tablet Sulfamethoxazole-

Trimethoprim 800-160 MG   Sulfamethoxazole-Trimethoprim 800-160 MG 2020 

12:00:00 AM EST                                    active               Sulfamet

hoxazole-Trimethoprim 800-160 MG 

eCW1 ([facility])

 

                                        Sulfamethoxazole 800 MG / Trimethoprim 1

60 MG Oral Tablet Sulfamethoxazole-

Trimethoprim 800-160 MG   Sulfamethoxazole-Trimethoprim 800-160 MG 2020 

12:00:00 AM EST                                    active               Sulfamet

hoxazole-Trimethoprim 800-160 MG 

eCW1 ([facility])

 

                                        Sulfamethoxazole 800 MG / Trimethoprim 1

60 MG Oral Tablet Sulfamethoxazole-

Trimethoprim 800-160 MG   Sulfamethoxazole-Trimethoprim 800-160 MG 2020 

12:00:00 AM EST                                    active               Sulfamet

hoxazole-Trimethoprim 800-160 MG 

eCW1 ([facility])

 

           Famotidine 20 MG Oral Tablet [Pepcid] Pepcid     2020 12:00:00 

AM EST                       ORAL

                              active                                  [facility] ([facility])

 

        Prednisone 10 MG Oral Tablet Prednisone 2020 12:00:00 AM EST      

           ORAL                    

completed                                                       [facility] ([facility], )

 

                    Cholecalciferol 84947 UNT Oral Tablet Vitamin D3 Ultra Poten

cy 2020 

12:00:00 AM EDT               ORAL                 active                      M

EDENT ([facility])

 

        Stiolto Respimat Stiolto Respimat 2020 12:00:00 AM EDT            

     RESPIRATORY          

             completed                                           [facility] ([facility], )

 

                    formoterol fumarate 0.01 MG/ML Inhalant Solution [Perforomis

t] Perforomist         

2020 12:00:00 AM EDT                                    completed         

             [facility] ([facility], )

 

             Budesonide 0.5 MG/ML Inhalant Solution Budesonide   2020 12:0

0:00 AM EDT               

                                active                          [facility] ([facility] )

 

                    arformoterol 0.0075 MG/ML Inhalant Solution [Brovana] Brovan

a             2020 

12:00:00 AM EDT                                    completed                    

  [facility] ([facility], 

)

 

                                        60 ACTUAT Budesonide 0.16 MG/ACTUAT / fo

rmoterol fumarate 0.0045 MG/ACTUAT 

Metered Dose Inhaler [Symbicort] Symbicort    2020 12:00:00 AM EDT        

                   

RESPIRATORY                     active                                  [facility] (

[facility], )

 

        Spiriva Respimat Spiriva Respimat 2020 12:00:00 AM EDT            

     RESPIRATORY          

             active                                              [facility] ([facility], )



                                                                                
                                                                                
                  



Insurance Providers

          



             Payer name   Policy type / Coverage type Policy ID    Covered party

 ID Covered 

party's relationship to mckeon Policy [place]             Plan Information

 

          [employer] FED[employer]AL EMPLOYEE PROGRAM           [id number]           SP        

          [id number]

 

          [employer] FED[employer]AL EMPLOYEE PROGRAM           [id number]           SP        

          [id number]

 

          [employer] FED[employer]AL EMPLOYEE PROGRAM           [id number]           SP        

          [id number]

 

          SELF PAY ONLY           [id number]           SP                  [id number]

[id number]

 

          [employer] FEP BS        [id number]           [id number]                  [id number]

[id number]

 

          [employer] Fep Commercial [id number]           Self                [id number]

[id number]

 

          [employer]UE [employer] FEDERAL O/P           [id number]           [id number]   

               [id number]

 

          [employer] [id number]           Self                [id number]

[id number]

 

          [employer]CELLUS  C         [id number]           Self                [id number]

 

          [employer]ep [employer] [id number]           Self                [id number]

[id number]

 

          BLUE [place] BLUE [facility] FEDERAL     -PHYS           [id number]          

 [id number]                  [id number]

 

          [employer]BS Federal Commercial [id number]           Self                [id number]

[id number]

 

          [employer]BS Federal Commercial [id number]           Self                [id number]

[id number]

 

          [employer]ep [employer] [id number]           Self                [id number]

[id number]

 

          [employer]ep [employer] [id number]           Self                [id number]

[id number]

 

          [employer] Fep [employer] [id number]           Self                [id number]

[id number]

 

          [employer]BS Federal Plan Commercial [id number]           Self                [id number]

[id number]

 

          [employer]US [employer] FEDERAL           [id number]           SP                

  [id number]

 

          EXCELLUS [employer]BS FEDERAL           [id number]           SP                

  [id number]

 

          BS Federal Commercial                     Self                 

 

          [employer] BS [place] FEDERAL P         [id number]           S              

     [id number]

 

          [employer] [place] P         [id number]           [id number]                   

[id number]

 

                              [id number]                               [id number]



                                                                                
                                                                                
                                                                                
                                                                    



Problems, Conditions, and Diagnoses

          



           Code       Display Name Description Problem Type Effective Dates Data

 Source(s)

 

             B370         Candidal stomatitis Candidal stomatitis Diagnosis    1

2020 08:53:00 AM 

[facility]

 

                    K219                Gastro-esophageal reflux disease without

 esophagitis Gastro-esophageal 

reflux disease without esophagitis Diagnosis           2020 08:53:00 AM [facility]

 

                    J449                Chronic obstructive pulmonary disease, u

nspecified Chronic obstructive 

pulmonary disease, unspecified Diagnosis           2020 08:53:00 AM [facility]

 

                                   Obstructive sleep apnea (adult) (pediatr

ic) Obstructive sleep apnea 

(adult) (pediatric) Diagnosis           2020 08:53:00 AM EST [facility]

 

                                   Encounter for general adult medical exam

ination with abnormal findings 

Encounter for general adult medical examination with abnormal findings Diagnosis

                          08/10/2020 08:40:00 AM EDT [facility]

 

             R233         Spontaneous ecchymoses Spontaneous ecchymoses Diagnosi

s    08/10/2020 08:40:00

 AM EDT                                 [facility]

 

             R140         Abdominal distension (gaseous) Abdominal distension (g

aseous) Diagnosis    

08/10/2020 08:40:00 AM EDT              [facility]



                                                                                
                                                                              



Surgeries/Procedures

          



             Procedure    Description  Date         Indications  Data Source(s)

 

             [facility]                2020 12:00:00 AM EST              

[facility]

 

             Spirometry                2020 12:00:00 AM EDT              [facility] ([facility], 

)



                                                                                
                  



Results

          



                    ID                  Date                Data Source

 

                    [id number]         2021 08:30:00 AM EST [facility]









          Name      Value     Range     Interpretation Code Description Data Amanda

rce(s) Supporting 

Document(s)

 

          SARS coronavirus 2 RNA Not Detected                               [facility]

[place]     

 

                                        This lab was ordered by [facility] and reported by [facility]. 









                    ID                  Date                Data Source

 

                    [id number]         2021 12:17:00 PM EST [facility] ([facility])









          Name      Value     Range     Interpretation Code Description Data Amanda

rce(s) Supporting 

Document(s)

 

                Prostate specific Ag [Mass/volume] in Serum or Plasma 1.04 ng/mL

                      Normal 

(applies to non-numeric results)                     [facility] ([facility])  

 

                                        The PSA assay is performed on the Siemen

s Vista analyzer by

LOC[employer] sandwich chemiluminescent immunoassay and should not be

compared interchangeably with other methods.  It should not

be used alone as a screening test or diagnosis for the

presence or absence of malignant disease.  Predictions of

disease recurrence should not be based solely on values

obtained from serial patient serum values.

 









                    ID                  Date                Data Source

 

                    [id number]         2021 01:55:00 PM EST [facility]









          Name      Value     Range     Interpretation Code Description Data Amanda

rce(s) Supporting 

Document(s)

 

          SARS coronavirus 2 RNA Not Detected                               [facility]

[place]     

 

                                        [facility] Center and reported by [facility]. 









                    ID                  Date                Data Source

 

                    [id number]             2021 12:26:00 PM EST [facility]









          Name      Value     Range     Interpretation Code Description Data Amanda

rce(s) Supporting 

Document(s)

 

          SARS COVID ANTIGEN NEGATIVE                                [facility]     

 

                                        This lab was ordered by YURI rojas

nd reported by [facility].

 









                    ID                  Date                Data Source

 

                    [id number]         2020 06:37:00 PM EST [facility] ([facility])









          Name      Value     Range     Interpretation Code Description Data Amanda

rce(s) Supporting 

Document(s)

 

           Glucose, Fasting 98 mg/dL        Normal (applies to non-numeric

 results)            

[facility] ([facility])  

 

             Creatinine For GFR 0.76 mg/dL   0.70-1.30    Normal (applies to non

-numeric results) 

                          [facility] ([facility])  

 

                Glomerular Filtration Rate Laboratory test result               

  Normal (applies to non-

numeric results)                        [facility] ([facility]) 

 

 

                                        <content>Units are mL/min/1.73 

m2</content><br/><content></content><br/><content>Chronic Kidney Disease Staging
 per NKF:</content><br/><content></content><br/><content>Stage I & II   GFR >=60
       Normal to Mildly Decreased</content><br/><content>Stage III      GFR 30-
59      Moderately Decreased</content><br/><content>Stage IV       GFR 15-29    
  Severely Decreased</content><br/><content>Stage V        GFR <15        Very 
Little GFR Left</content><br/><content>ESRD           GFR <15 on 
RRT</content><br/><content></content> 

 

           Blood Urea Nitrogen 8 mg/dL    7-18       Normal (applies to non-nume

jenae results)            

[facility] ([facility])  

 

           Potassium Serum 4.4 meq/L  3.5-5.1    Normal (applies to non-numeric 

results)            

[facility] ([facility])  

 

           Sodium Level 139 meq/L  136-145    Normal (applies to non-numeric res

ults)            [facility] 

([facility])         

 

           Anion Gap  4 meq/L    8-16       Below low normal            [facility] (

[facility])                                 

 

           Carbon Dioxide Level 33 meq/L   21-32      Above high normal         

   [facility] ([facility])                        

 

           Chloride Level 102 meq/L       Normal (applies to non-numeric r

esults)            [facility]

 ([facility])        

 

           Calcium Level 9.3 mg/dL  8.5-10.1   Normal (applies to non-numeric re

sults)            

[facility] ([facility])  









                    ID                  Date                Data Source

 

                    [id number]         2020 06:37:00 PM EST [facility] ([facility])









          Name      Value     Range     Interpretation Code Description Data Amanda

rce(s) Supporting 

Document(s)

 

           Alt/SGPT   22 U/L     12-78      Normal (applies to non-numeric resul

ts)            [facility] ([facility])                  

 

           Ast/Sgot   20 U/L     7-37       Normal (applies to non-numeric resul

ts)            [facility] ([facility])                   

 

           Bilirubin,Total 0.5 mg/dL  0.2-1.0    Normal (applies to non-numeric 

results)            

[facility] ([facility])  

 

           Alkaline Phosphatase 121 U/L         Above high normal         

   [facility] ([facility])                        

 

           Bilirubin,Direct 0.2 mg/dL  0.0-0.2    Normal (applies to non-numeric

 results)            

[facility]  

 

           Total Protein 7.5 GM/DL  6.4-8.2    Normal (applies to non-numeric re

sults)            [facility]        

 

           Albumin    3.7 GM/DL  3.2-5.2    Normal (applies to non-numeric resul

ts)            [facility] 

([facility])         

 

           Albumin/Globulin Ratio 1.0                   Normal (applies to non-n

umeric results)            [facility] 

[facility])         









                    ID                  Date                Data Source

 

                    [id number]         2020 06:37:00 PM EST [facility] ([facility])









          Name      Value     Range     Interpretation Code Description Data Amanda

rce(s) Supporting 

Document(s)

 

                CPK Creatine Phosphokinase 109 U/L                   [place]

l (applies to non-numeric 

results)                                [facility] ([facility]) 

 

 

           CK-MB Value Mass 2.5 ng/mL             Normal (applies to non-numeric

 results)            [facility] 

([facility])         

 

           MB/CK Relative Index 2.29                  Normal (applies to non-num

jeff results)            [facility] 

([facility])         

 

                                        <content>DIAGNOSIS CRITERIA</content><br

/><content>MMB ng/ml       Relative 

Index (RI)</content><br/><content>NON-AMI               < or = 5               
N/A</content><br/><content>GRAY ZONE              > 5                < or = 
4</content><br/><content>AMI                    > 5                   > 
4</content><br/><content></content> 

 

           Troponin I Laboratory test result            Normal (applies to non-n

umeric results)            

[facility]  

 

                                        <content>Troponin I Reference Interval f

or Siemens [place] 

LOCI:</content><br/><content></content><br/><content>99th Percentile= 0.00-0.045
 ng/ml</content><br/><content></content><br/><content>Risk 
Stratification:</content><br/><content><= 0.10 ng/ml   Decreased Risk for 
Adverse Clinical</content><br/><content>Events.</content><br/><content>0.10-1.50
 ng/ml   Increased Risk for Adverse Clinical</content><br/><content>Events. 
Evaluation of additional</content><br/><content>criterion and/or repeat testing 
in 2-6</content><br/><content>hours is suggested to rule out 
myocardial</content><br/><content>damage.</content><br/><content>>= 1.50 ng/ml  
 Indicative of Myocardial Injury.</content><br/><content></content> 









                    ID                  Date                Data Source

 

                    [id number]         2020 06:37:00 PM EST [facility] ([facility])









          Name      Value     Range     Interpretation Code Description Data Amanda

rce(s) Supporting 

Document(s)

 

           White Blood Count 7.8 10     4.0-10.0   Normal (applies to non-numeri

c results)            

[facility] ([facility])  

 

           Hematocrit 48.0 %     42.0-52.0  Normal (applies to non-numeric resul

ts)            [facility] 

([facility])         

 

           Hemoglobin 15.5 g/dL  13.5-17.5  Normal (applies to non-numeric resul

ts)            [facility] 

([facility])         

 

           Red Blood Count 4.79 10    4.30-6.10  Normal (applies to non-numeric 

results)            

[facility] ([facility])  

 

                Red Cell Distribution Width 12.9 %          11.5-14.5       Norm

al (applies to non-numeric 

results)                                [facility] ([facility]) 

 

 

                Mean Corpuscular Hemoglobin 32.4 pg         27.0-33.0       Norm

al (applies to non-numeric 

results)                                [facility] ([facility]) 

 

 

                Mean Corpuscular HGB Conc 32.3 g/dL       32.0-36.5       Normal

 (applies to non-numeric 

results)                                [facility] ([facility]) 

 

 

           Mean Corpuscular Volume 100.2 fl   80.0-96.0  Above high normal      

      [facility] ([facility])                   

 

                Platelet Count, Automated 330 10          150-450         Normal

 (applies to non-numeric results)

                                        [facility] ([facility]) 

 

 

           Lymph %    19.1 %     24.0-44.0  Below low normal            [facility] (

[facility])                                 

 

           Neutrophils % 69.4 %     36.0-66.0  Above high normal            [facility]

[facility] ([facility])                        

 

           Mono %     10.2 %     0.0-5.0    Above high normal            [facility] 

([facility])                                 

 

           Eos %      0.1 %      0.0-3.0    Normal (applies to non-numeric resul

ts)            [facility] ([facility])                   

 

           Baso %     0.8 %      0.0-1.0    Normal (applies to non-numeric resul

ts)            [facility] ([facility])                   

 

           Nucleated Red Blood Cell % 0.0 %      0-0        Normal (applies to n

on-numeric results)            

[facility] ([facility])  

 

           Neutrophils # 5.4 10     1.5-8.5    Normal (applies to non-numeric re

sults)            [facility] 

([facility])         

 

           Immature Granulocyte % 0.4 %      0-3.0      Normal (applies to non-n

umeric results)            

[facility] ([facility])  

 

           Eos #      0.0 10     0.0-0.5    Normal (applies to non-numeric resul

ts)            [facility] ([facility])                   

 

           Mono #     0.8 10     0.0-0.8    Normal (applies to non-numeric resul

ts)            [facility] ([facility])                  

 

           Lymph #    1.5 10     1.5-5.0    Normal (applies to non-numeric resul

ts)            [facility] 

([facility])         

 

           Baso #     0.1 10     0.0-0.2    Normal (applies to non-numeric resul

ts)            [facility] ([facility])                  









                    ID                  Date                Data Source

 

                    URINE CULTURE       2020 12:00:00 AM EST eCW1 ([facility])









          Name      Value     Range     Interpretation Code Description Data Amanda

rce(s) Supporting 

Document(s)

 

                                                  URINE CULTURE eCW1 ([facility])  









                    ID                  Date                Data Source

 

                    UA URINALYSIS       2020 12:00:00 AM EST eCW1 ([facility])









          Name      Value     Range     Interpretation Code Description Data Amanda

rce(s) Supporting 

Document(s)

 

                                                  UA URINALYSIS eCW1 ([facility])  









                    ID                  Date                Data Source

 

                    [id number]         12/10/2020 12:40:00 PM EST [facility] ([facility])









          Name      Value     Range     Interpretation Code Description Data Amanda

rce(s) Supporting 

Document(s)

 

           Laboratory test finding (navigational concept) Laboratory test result

                                  

[facility] ([facility])  

 

                                        ----------------------------------------

--------------------

Test: COVID-19 Nasal/Naspharynx

Result: NOT DETECTED           Reference Units: Not detected

                                        ----------------------------------------

--------------------

Note: Please consider re-collection of a new specimen, if

clinically indicated.



Note: The COVID-19 assay is under Emergency Use

Authorization(EUA) by the U.S. Food and Drug Administration.



[facility] is designated as a high complexity

laboratory by the Clinical Laboratory Improvement Amendments

of 1988(CLIA) and is qualified to perform this test.

ASSAY INFORMATION: Real Time RT-PCR



 









                    ID                  Date                Data Source

 

                    [id number]           12/10/2020 12:00:00 AM EST [facility]









          Name      Value     Range     Interpretation Code Description Data Amanda

rce(s) Supporting 

Document(s)

 

          2019-nCoV RNA XXX ELIZABETH+probe-Imp                                       

  [facility]     

 

                                        This lab was ordered by [facility] and reported by 

[employer] 









                    ID                  Date                Data Source

 

                    [id number]            08/10/2020 11:11:00 AM EDT [facility] ([facility]









          Name      Value     Range     Interpretation Code Description Data Amanda

rce(s) Supporting 

Document(s)

 

           White Blood Count Laboratory test result 4.3-10.9                    

     [facility] ([facility]                       

 

          Hemoglobin 16.6      14.0-16.0                     [facility] ([facility])  

 

           Red Blood Count Laboratory test result 4.70-6.20                     

   [facility] ([facility])                       

 

          Platelets 279       150-450                       [facility] ([facility]

[facility])  

 

          Hematocrit 49.7      41.0-51.0                     [facility] ([facility])  









                    ID                  Date                Data Source

 

                    [id number]            08/10/2020 11:11:00 AM EDT [facility] ([facility])









          Name      Value     Range     Interpretation Code Description Data Amanda

rce(s) Supporting 

Document(s)

 

           Albumin [Mass/volume] in Serum or Plasma 4.7                         

                [facility] ([facility])                       

 

             Alanine aminotransferase [Enzymatic activity/volume] in Serum or Pl

asma 25                                     

                          [facility] ([facility])  

 

           Calcium [Mass/volume] in Serum or Plasma 9.5                         

                [facility] ([facility]                       

 

           Carbon dioxide, total [Moles/volume] in Serum or Plasma 28           

                               [facility] 

([facility])           

 

           Alkaline phosphatase [Enzymatic activity/volume] in Serum or Plasma 1

17                                         

[facility] ([facility])    

 

           Chloride [Moles/volume] in Serum or Plasma 100                       

                  [facility] ([facility])                       

 

           Protein [Mass/volume] in Serum or Plasma 7.3                         

                [facility] ([facility])                       

 

           Potassium [Moles/volume] in Serum or Plasma 4.5                      

                   [facility] ([facility])                       

 

          Sodium    141                                     [facility] ([facility]

[facility])  

 

           Urea nitrogen [Mass/volume] in Serum or Plasma 14                    

                      [facility] ([facility] 

[facility])                       

 

                Aspartate aminotransferase [Enzymatic activity/volume] in Serum 

or Plasma 27                              

                                        [facility] ([facility])  

 

          Glucose   97                                [facility]  

 

          Creatinine For GFR 0.8                                     [facility]  









                    ID                  Date                Data Source

 

                    [id number]            08/10/2020 11:11:00 AM EDT [facility] ([facility])









          Name      Value     Range     Interpretation Code Description Data Amanda

rce(s) Supporting 

Document(s)

 

          Thyroid Stimulating Hormone 0.89                                    ME

[facility] ([facility])  









                    ID                  Date                Data Source

 

                    [id number]            08/10/2020 11:11:00 AM EDT [facility] ([facility])









          Name      Value     Range     Interpretation Code Description Data Amanda

rce(s) Supporting 

Document(s)

 

           Hemoglobin A1c/Hemoglobin.total in Blood 6.0                         

                [facility] ([facility] 

[facility])                       









                    ID                  Date                Data Source

 

                    [id number]         08/10/2020 09:36:00 AM EDT [facility] ([facility])









          Name      Value     Range     Interpretation Code Description Data Amanda

rce(s) Supporting 

Document(s)

 

           Calcidiol [Mass/volume] in Serum or Plasma 23 ng/mL                  

                  [facility] ([facility])                        

 

                                        <content>VITAMIN-D(25HYDROXY)</content><

br/><content>Deficiency: <=20 

ng/ml</content><br/><content>Insufficiency: 21-29 
ng/ml</content><br/><content>Preferred level: => 30 ng/ml</content><br/><conten
t></content> 

 

           Thyrotropin [Units/volume] in Serum or Plasma 0.89 uIU/mL 0.47-5.01  

                      [facility] 

([facility])         

 

             Cobalamin (Vitamin B12) [Mass/volume] in Serum or Plasma 1084 pg/mL

   [id number]                   

                          [facility] ([facility])  









                    ID                  Date                Data Source

 

                    [id number]         08/10/2020 09:36:00 AM EDT [facility] ([facility])









          Name      Value     Range     Interpretation Code Description Data Amanda

rce(s) Supporting 

Document(s)

 

           Cholesterol 202 mg/dL  131-200    Above high normal            [facility]

 ([facility])                                

 

          Cve Panel Laboratory test result                               [facility] 

([facility])  

 

                                        LIPID PANEL

 

 

          HDL       69 mg/dL  29-86                         [facility] ([facility])  

 

          LDL       117 mg/dL                         [facility] ([facility])  

 

          Triglycerides 88 mg/dL                          [facility] ([facility])  

 

           Risk Factor 2.9        3.4-4.9    Below low normal            [facility] 

([facility])                                 

 

          LDL/HDL   1.70      1.00-3.55                     [facility] ([facility])  

 

                                        CVE

RISK           CHOL/HDL       LDL/HDL

MEN:

                                        1/2  AVERAGE          3.43          1.00

AVERAGE          4.97          3.55

                                        2X   AVERAGE          9.55          6.25

                                        3X   AVERAGE         23.99          7.99

WOMEN:

                                        1/2  AVERAGE          3.27          1.47

AVERAGE          4.44          3.22

                                        2X   AVERAGE          7.05          5.03

                                        3X   AVERAGE         11.04          6.14

 









                    ID                  Date                Data Source

 

                    [id number]         08/10/2020 09:36:00 AM EDT [facility] ([facility])









          Name      Value     Range     Interpretation Code Description Data Amanda

[facility](s) Supporting 

Document(s)

 

           Comprehensive Metabo Laboratory test result                          

        [facility] ([facility])                                

 

                                        COMPREHENSIVE METABOLIC PANEL

 

 

          Sodium    141 meq/L 134-153                       [facility] ([facility])  

 

          Chloride  100 meq/L                         [facility] ([facility])  

 

          Potassium 4.5 meq/L 3.6-5.0                       [facility] ([facility])  

 

          Co2       28 meq/L  22-30                         [facility] ([facility])  

 

          Glucose   97 mg/dL                          [facility] ([facility])  

 

          Creatinine 0.8 mg/dL 0.7-1.5                       [facility] ([facility])  

 

          BUN       11 mg/dL  7-21                          [facility] ([facility])  

 

          BUN/Creat 14        8-27                          [facility] ([facility])  

 

          Total Protein 7.3 g/dL  6.3-8.2                       [facility] ([facility])  

 

          A/G Ratio 1.8       0.8-2.0                       [facility] ([facility])  

 

          Globulin  2.6 GM/DL 2.4-3.2                       [facility] ([facility])  

 

          Albumin   4.7 g/dL  3.9-5.0                       [facility] ([facility])  

 

          Calcium   9.5 mg/dL 8.4-10.2                      [facility] ([facility])  

 

          Alkaline Phos 117 U/L                           [facility] ([facility])  

 

          Total Bili 0.7 mg/dL 0.2-1.3                       [facility] ([facility])  

 

          Anion Gap 13.0 mmol/L 8.0-16.0                      [facility] ([facility])  

 

          Sgot/Ast  27 U/L    5-40                          [facility] ([facility])  

 

          SGPT/Alt  25 U/L    7-56                          [facility] ([facility])  

 

           Afr Amer GFR Laboratory test result                                  

[facility] ([facility])                                 

 

                                        Male GFR Interprentation

                                        20-49 yrs     >60 mL/min   Normal

                                        50-59 yrs     >56 mL/min   Normal

                                        60-69 yrs     >49 mL/min   Normal

                                        70-79yrs      >42 mL/min   Normal

                                        80 and above  >35 mL/min   Normal

Female GFR  Interpretation

                                        20-39 yrs     >60 mL/min   Normal

                                        40-49 yrs     >58 mL/min   Normal

                                        50-59 yrs     >51 mL/min   Normal

                                        60-69 yrs     >45 mL/min   Normal

                                        70-79 yrs     >39 mL/min   Normal

                                        80 and above  >32 mL/min   Normal

 

 

          Age       60 yrs                                  [facility] ([facility])  

 

          Non-Aa GFR Laboratory test result                               [facility]

 ([facility]) 

 









                    ID                  Date                Data Source

 

                    [id number]         08/10/2020 09:36:00 AM EDT [facility] ([facility])









          Name      Value     Range     Interpretation Code Description Data Amanda

rce(s) Supporting 

Document(s)

 

           CBC W/Automated Diff Laboratory test result                          

        [facility] ([facility])                                

 

                                        COMPLETE BLOOD COUNT

 

 

          WBC       6.7 10^3/uL 4.2-11.0                      [facility] ([facility])  

 

          Hematocrit 49.7 %    41.0-51.0                     [facility] ([facility])  

 

          RBC       5.00 10^6/uL 4.50-6.30                     [facility] ([facility])  

 

           Hemoglobin 16.6 g/dL  14.0-16.0  Above high normal            [facility] 

([facility])                        

 

          MCH       33.2 pg   27.0-34.0                     [facility] ([facility])  

 

           MCV        99.4 fL    80.0-94.0  Above high normal            [facility] 

([facility])                                 

 

          MCHC      33.4 g/dL 31.0-36.0                     [facility] ([facility])  

 

          RDW       13.3 %    11.5-14.8                     [facility] ([facility])  

 

          Platelets 279 10^3/uL 150-450                       [facility] ([facility])  

 

           Lymph      18.4 %     25.0-40.0  Below low normal            [facility] (

[facility])                                 

 

          MPV       8.8 fL    7.4-10.4                      [facility] ([facility])  

 

          Neut      69.6 %    37.0-80.0                     [facility] ([facility])  

 

          Eos       2.1 %     0.0-7.0                       [facility] ([facility])  

 

          Mono      8.8 %     3.0-8.0   Above high normal           [facility] ([facility])  

 

          Baso      1.0 %     0.0-2.0                       [facility] ([facility])  

 

          %Ig       0.1 %     0.0-0.0   Above high normal           [facility] ([facility])  

 

          %NRBC     0.0 %     0.0-0.0                       [facility] ([facility])  

 

          #Neut     4.64 10^3/uL 2.00-6.90                     [facility] ([facility])  

 

          #Mono     0.59 10^3/uL 0.00-0.90                     [facility] ([facility])  

 

          #Lymph    1.23 10^3/uL 0.60-3.40                     [facility] ([facility])  

 

          #Eos      0.14 10^3/uL 0.00-0.70                     [facility] ([facility])  

 

          #Baso     0.07 10^3/uL 0.00-0.20                     [facility] ([facility])  

 

          #Ig       0.01 10^3/uL 0.00-0.10                     [facility] ([facility])  

 

          #NRBC     0.00 10^3/uL 0.00-0.00                     [facility] ([facility])  

 

           Manual Diff Laboratory test result                                  M

[facility] ([facility])

                                         

 

          Segs      71 %      37-80                         [facility] ([facility])  

 

          %Eos      1 %       0-7                           [facility] ([facility])  

 

          %Lymph    18 %      25-40     Below low normal           [facility] ([facility])  

 

          %Mono     10 %      3-8       Above high normal           [facility] ([facility])  

 

          RBC Morph Laboratory test result                               [facility] 

([facility])  









                    ID                  Date                Data Source

 

                    [id number]         08/10/2020 09:36:00 AM EDT [facility] ([facility])









          Name      Value     Range     Interpretation Code Description Data Amanda

rce(s) Supporting 

Document(s)

 

           Hemoglobin A1c/Hemoglobin.total in Blood 6.0 %      4.4-6.1          

                [facility] ([facility])                       

 

                                        {A1]

{HB]

 









                    ID                  Date                Data Source

 

                    [id number]         08/10/2020 09:36:00 AM EDT [facility] ([facility])









          Name      Value     Range     Interpretation Code Description Data Amanda

rce(s) Supporting 

Document(s)

 

           Lab - Specimen Rejec Laboratory test result                          

        [facility] ([facility])                                 

 

                                        Specimen Integrity/Specimen Recollection

The patient sample needs to be resubmitted for the following reason:

 

 

           Test(s) Ordered Laboratory test result                               

   [facility] ([facility])                                 

 

           Rejection Reason Laboratory test result                              

    [facility] ([facility])                                 

 

                                        {          One or more of the tests you 

ordered cannot be performed.

{          Please recollect, reorder, and resubmit if needed.

 









                    ID                  Date                Data Source

 

                    [id number]         08/10/2020 09:36:00 AM EDT [facility] ([facility])









          Name      Value     Range     Interpretation Code Description Data Amanda

rce(s) Supporting 

Document(s)

 

           Hemoglobin A1c/Hemoglobin.total in Blood Laboratory test result      

                            [facility] 

([facility])         

 

           Thyrotropin [Units/volume] in Serum or Plasma Laboratory test result 

                                 

[facility] ([facility])  

 

                    Cobalamin (Vitamin B12) [Mass/volume] in Serum or Plasma Lab

oratory test result 

                                                [facility] ([facility]

[facility])  

 

           Calcidiol [Mass/volume] in Serum or Plasma Laboratory test result    

                              [facility] 

([facility])         

 

           Folate [Mass/volume] in Red Blood Cells Laboratory test result       

                           [facility] 

([facility])         









                    ID                  Date                Data Source

 

                    [id number]     08/10/2020 04:12:00 PM EDT [facility]









          Name      Value     Range     Interpretation Code Description Data Amanda

rce(s) Supporting 

Document(s)

 

                Cobalamin (Vitamin B12) [Mass/volume] in Serum or Plasma 1084 PG

/ML      232 - 1245       

                                        [facility]  









                    ID                  Date                Data Source

 

                    [id number]     08/10/2020 04:12:00 PM EDT [facility]









          Name      Value     Range     Interpretation Code Description Data Amanda

rce(s) Supporting 

Document(s)

 

           Calcidiol [Moles/volume] in Serum or Plasma 23 NG/ML                 

                   [facility]                                 

 

                                                                     VITAMIN-D(2

5HYDROXY)                          

Deficiency: <=20 ng/ml                          Insufficiency: 21-29 ng/ml      
                    Preferred level: => 30 ng/ml 









                    ID                  Date                Data Source

 

                    [id number]     08/10/2020 04:12:00 PM EDT [facility]









          Name      Value     Range     Interpretation Code Description Data Amanda

rce(s) Supporting 

Document(s)

 

                          Thyrotropin [Units/volume] in Serum or Plasma by Detec

tion limit <= 0.05 mIU/L 

0.89 uIU/mL  0.47 - 5.01                            [facility]  









                    ID                  Date                Data Source

 

                    [id number]     08/10/2020 03:52:00 PM EDT [facility]









          Name      Value     Range     Interpretation Code Description Data Amanda

rce(s) Supporting 

Document(s)

 

          [facility]E PANEL                                         [facility]

[facility]  

 

                                            LIPID PANEL 

 

           Cholesterol [Mass/volume] in Serum or Plasma 202 MG/DL  131 - 200  H 

                    [facility]                            

 

           Deprecated Triglyceride [Mass/volume] in Serum or Plasma 88 MG/DL   3

5 - 160                         

[facility]                   

 

          HDL       69 MG/DL  29 - 86                       [facility]

[facility]  

 

                    Cholesterol in LDL [Mass/volume] in Serum or Plasma by Direc

t assay 117 mg/dL           65

 - 175                                          [facility]  

 

                    Cholesterol.total/Cholesterol in HDL [Mass Ratio] in Serum o

r Plasma 2.9                 3.4 - 

4.9             L                               [facility]  

 

          LDL/HDL   1.70      1.00 - 3.55                     [facility]

ital  

 

                                        CVE         RISK           CHOL/HDL     

  LDL/HDLMEN:    1/2  AVERAGE          

3.43          1.00         AVERAGE          4.97          3.55    2X   AVERAGE  
       9.55          6.25    3X   AVERAGE         23.99          7.99WOMEN:    
1/2  AVERAGE          3.27          1.47         AVERAGE          4.44          
3.22    2X   AVERAGE          7.05          5.03    3X   AVERAGE         11.04  
       6.14 









                    ID                  Date                Data Source

 

                    [id number]     08/10/2020 03:46:00 PM EDT [facility]









          Name      Value     Range     Interpretation Code Description Data Amanda

rce(s) Supporting 

Document(s)

 

          COMPREHENSIVE METABOLIC PANEL                                         

[facility]  

 

                                            COMPREHENSIVE METABOLIC PANEL 

 

           Sodium [Moles/volume] in Serum or Plasma 141 mEq/L  134 - 153        

                [facility]                                 

 

           Potassium [Moles/volume] in Serum or Plasma 4.5 mEq/L  3.6 - 5.0     

                   [facility]                            

 

           Chloride [Moles/volume] in Serum or Plasma 100 mEq/L  98 - 107       

                  [facility]                                 

 

           Carbon dioxide, total [Moles/volume] in Serum or Plasma 28 MEQ/L   22

 - 30                          

[facility]                   

 

           Glucose [Mass/volume] in Serum or Plasma 97 MG/DL   65 - 110         

                [facility]                                 

 

          BUN       11 MG/DL  7 - 21                        [facility]

[facility]  

 

           Creatinine [Mass/volume] in Serum or Plasma 0.8 MG/DL  0.7 - 1.5     

                   [facility]                            

 

          BUN/CREAT 14        8 - 27                        [facility]

al  

 

           Protein [Mass/volume] in Serum or Plasma 7.3 G/DL   6.3 - 8.2        

                [facility]                                 

 

           Albumin [Mass/volume] in Serum or Plasma 4.7 G/DL   3.9 - 5.0        

                [facility]                                 

 

           Globulin [Mass/volume] in Serum by calculation 2.6 GM/DL  2.4 - 3.2  

                      [facility]                            

 

          A/G RATIO 1.8       0.8 - 2.0                     [facility]

al  

 

           Calcium [Mass/volume] in Serum or Plasma 9.5 MG/DL  8.4 - 10.2       

                [facility]                                 

 

           Bilirubin.total [Mass/volume] in Serum or Plasma 0.7 MG/DL  0.2 - 1.3

                        

[facility]                   

 

                    Alkaline phosphatase [Enzymatic activity/volume] in Serum or

 Plasma 117 U/L             38 -

126                                             [facility]  

 

                          Aspartate aminotransferase [Enzymatic activity/volume]

 in Serum or Plasma 27 U/L

             5 - 40                                 [facility]  

 

                    Alanine aminotransferase [Enzymatic activity/volume] in Seru

m or Plasma 25 U/L              7

- 56                                            [facility]  

 

           Anion gap 3 in Serum or Plasma 13.0 mmol/L 8.0 - 16.0                

       [facility]

                                         

 

          AGE       60 yrs                                  [facility]

al  

 

          NON-AA GFR >60 mL/min                               [facility]

ital  

 

          AFR AMER GFR >60 mL/min                               [facility]

spital  

 

                                                                     Male GFR In

terprentation                  20-49 yrs

    >60 mL/min   Normal                  50-59 yrs     >56 mL/min   Normal      
           60-69 yrs     >49 mL/min   Normal                  70-79yrs      >42 
mL/min   Normal                  80 and above  >35 mL/min   Normal              
     Female GFR  Interpretation                  20-39 yrs     >60 mL/min   
Normal                  40-49 yrs     >58 mL/min   Normal                  50-59
yrs     >51 mL/min   Normal                  60-69 yrs     >45 mL/min   Normal  
               70-79 yrs     >39 mL/min   Normal                  80 and above  
>32 mL/min   Normal 









                    ID                  Date                Data Source

 

                    [id number]     08/10/2020 01:48:00 PM EDT [facility]









          Name      Value     Range     Interpretation Code Description Data Amanda

rce(s) Supporting 

Document(s)

 

          CBC W/AUTOMATED DIFF                                         [facility]  

 

                                            COMPLETE BLOOD COUNT 

 

           Leukocytes [#/volume] in Blood by Automated count 6.7 10^3/uL 4.2 - 1

1.0                       

[facility]                   

 

             Erythrocytes [#/volume] in Blood by Automated count 5.00 10^6/uL 4.

50 - 6.30                

                          [facility]     

 

           Hemoglobin [Mass/volume] in Blood 16.6 g/dL  14.0 - 16.0 H           

          [facility]                                 

 

           Hematocrit [Volume Fraction] of Blood by Automated count 49.7 %     4

1.0 - 51.0                       

[facility]                   

 

                    Erythrocyte mean corpuscular volume [Entitic volume] by Auto

mated count 99.4 fL             

80.0 - 94.0     H                               [facility]  

 

                          Erythrocyte mean corpuscular hemoglobin [Entitic mass]

 by Automated count 33.2 

pg           27.0 - 34.0                            [facility]  

 

                                        Erythrocyte mean corpuscular hemoglobin 

concentration [Mass/volume] by Automated

 count     33.4 g/dL  31.0 - 36.0                       [facility]  

 

             Erythrocyte distribution width [Ratio] by Automated count 13.3 %   

    11.5 - 14.8                

                          [facility]     

 

           Platelets [#/volume] in Blood by Automated count 279 10^3/uL 150 - 45

0                        

[facility]                   

 

                    Platelet mean volume [Entitic volume] in Blood by Automated 

count 8.8 fL              7.4 - 

10.4                                            [facility]  

 

           Neutrophils/100 leukocytes in Blood by Automated count 69.6 %     37.

0 - 80.0                       

[facility]                   

 

           Lymphocytes/100 leukocytes in Blood by Manual count 18.4 %     25.0 -

 40.0 L                     

[facility]                   

 

           Monocytes/100 leukocytes in Blood by Automated count 8.8 %      3.0 -

 8.0  H                     

[facility]                   

 

           Eosinophils/100 leukocytes in Blood by Automated count 2.1 %      0.0

 - 7.0                        

[facility]                   

 

           Basophils/100 leukocytes in Blood by Automated count 1.0 %      0.0 -

 2.0                        

[facility]                   

 

          %IG       0.1 %     0.0 - 0.0 H                   [facility]

[facility]  

 

          %NRBC     0.0 %     0.0 - 0.0                     [facility]

al  

 

           Neutrophils [#/volume] in Blood by Automated count 4.64 10^3/uL 2.00 

- 6.90                       

[facility]                   

 

           Lymphocytes [#/volume] in Blood by Automated count 1.23 10^3/uL 0.60 

- 3.40                       

[facility]                   

 

           Monocytes [#/volume] in Blood by Automated count 0.59 10^3/uL 0.00 - 

0.90                       

[facility]                   

 

           Eosinophils [#/volume] in Blood by Automated count 0.14 10^3/uL 0.00 

- 0.70                       

[facility]                   

 

           Basophils [#/volume] in Blood by Automated count 0.07 10^3/uL 0.00 - 

0.20                       

[facility]                   

 

          #IG       0.01 10^3/uL 0.00 - 0.10                     [facility]

[facility]  

 

          #NRBC     0.00 10^3/uL 0.00 - 0.00                     [facility]

ospital  

 

          MANUAL DIFF SEE BELOW                               [facility]

ital  

 

           Segmented neutrophils/100 leukocytes in Blood by Manual count 71 %   

    37 - 80                          

[facility]                   

 

          %LYMPH    18 %      25 - 40   L                   [facility]

[facility]  

 

          %MONO     10 %      3 - 8     H                   [facility]

al  

 

          %EOS      1 %       0 - 7                         [facility]

[facility]  

 

          RBC MORPH MORPH IS NORMAL                               [facility]  









                    ID                  Date                Data Source

 

                    [id number]     08/10/2020 01:27:00 PM EDT [facility]









          Name      Value     Range     Interpretation Code Description Data Amanda

rce(s) Supporting 

Document(s)

 

           Hemoglobin A1c/Hemoglobin.total in Blood 6.0 %      4.4 - 6.1        

                [facility]                                 

 

                                        {A1]{HB] 









                    ID                  Date                Data Source

 

                    [id number]     08/10/2020 12:52:00 PM EDT [facility]









          Name      Value     Range     Interpretation Code Description Data Amanda

rce(s) Supporting 

Document(s)

 

          LAB - SPECIMEN REJECTION                                         [facility]  

 

                                        Specimen Integrity/Specimen Recollection

 The patient sample needs to be 

resubmitted for the following reason: 

 

          Test(s) Ordered FOLIC ACID RBC                               [facility]  

 

          Rejection Reason QNS                                     [facility]  

 

                                        {          One or more of the tests you 

ordered cannot be performed.{          

Please recollect, reorder, and resubmit if needed. 







                                        Procedure

 

                                          



                                                                                
                                                                                
                                                                                
                                                                                
                                                                          



Social History

          



           Code       Duration   Value      Status     Description Data Source(s

)

 

           Smoking    2020 12:00:00 AM EST Former Smoker completed  Former

 Smoker [email or web address] 

([facility])

 

           Smoking    2020 12:00:00 AM EST Former Smoker completed  Former

 Smoker [email or web address] 

([facility])

 

           Smoking    2020 12:00:00 AM EST Former Smoker completed  Former

 Smoker [email or web address] 

([facility])

 

                    Smoking             2020 12:00:00 AM EST - 2019 

12:00:00 AM EST Patient is a 

former smoker       completed           Patient is a former smoker [email or web address] ([facility], )



                                                                                
                                               



Vital Signs

          



                    ID                  Date                Data Source

 

                    UNK                                      









           Name       Value      Range      Interpretation Code Description Data

 Source(s)

 

           Body surface area Derived from formula 1.51 m2                       

   1.51 m2    YUDY ([facility])

 

           Body weight 49.442 kg                        49.442 kg  [facility] ([facility])

 

           Ideal body weight 130 [lb_av]                       130 [lb_av] [facility]

T ([facility])

 

           Body mass index (BMI) [Ratio] 18.7 kg/m2                       18.7 k

g/m2 [facility] ([facility])

 

           Body weight 109.00 [lb_av]                       109.00 [lb_av] [facility]

T ([facility])

 

           Body height 64 [in_i]                        65 [in_i]  [facility] ([facility])

 

                                        5'4" 

 

           Diastolic blood pressure 68 mm[Hg]                        68 mm[Hg]  

MEDENT ([facility])

 

           Systolic blood pressure 128 mm[Hg]                       128 mm[Hg] M

EDENT ([facility])

 

           Diastolic blood pressure 82 mm[Hg]                        82 mm[Hg]  

eCW1 ([facility])

 

           Systolic blood pressure 122 mm[Hg]                       122 mm[Hg] e

CW1 ([facility])

 

           Body temperature 98.2 [degF]                       98.2 [degF] eCW1 (

[facility])

 

           Respiratory rate 20 /min                          20 /min    eCW1 ([facility])

 

           Heart rate 86 /min                          86 /min    W1 ([facility])

 

           Body mass index (BMI) [Ratio] 0.13 kg/m2                       0.13 k

g/m2 eCW1 ([facility])

 

           Body height                                   [in_i]    eCW1 ([facility])

 

           Body weight 110 [lb_av]                       110 [lb_av] eCW1 ([facility])

 

           Body surface area Derived from formula 1.54 m2                       

   1.54 m2    MEDENT ([facility])

 

           Body mass index (BMI) [Ratio] 19.4 kg/m2                       19.4 k

g/m2 [facility] ([facility])

 

           Body height 64 [in_i]                        64 [in_i]  [facility] ([facility])

 

                                        5'4" 

 

           Body weight 51.370 kg                        51.370 kg  [facility] ([facility])

 

           Body weight 113.25 [lb_av]                       113.25 [lb_av] [facility]

T ([facility])

 

           Oxygen saturation in Arterial blood by Pulse oximetry 96 %           

                  96 %       [facility] 

([facility])

 

           Respiratory rate 16 /min                          16 /min    [facility] (

[facility])

 

           Body temperature 97.1 [degF]                       97.1 [degF] [facility]

 ([facility])

 

           Heart rate 100 /min                         100 /min   [facility] ([facility])

 

           Diastolic blood pressure 80 mm[Hg]                        80 mm[Hg]  

[facility] ([facility])

 

           Systolic blood pressure 122 mm[Hg]                       122 mm[Hg] [facility] ([facility])

 

           Body surface area Derived from formula 1.54 m2                       

   1.54 m2    [facility] ([facility])

 

           Body weight 51.370 kg                        51.370 kg  [facility] ([facility])

 

           Ideal body weight 130 [lb_av]                       130 [facility]] [facility]

 ([facility]

 

           Body mass index (BMI) [Ratio] 19.4 kg/m2                       19.4 k

g/m2 [facility] ([facility])

 

           Body weight 113.25 [lb_av]                       113.25 [facility]lb_av] [facility]

 ([facility]

 

           Body height 64 [in_i]                        64 [in_i]  [facility] ([facility])

 

                                        5'4" 

 

           Body temperature 99.1 [degF]                       99.1 [degF] [facility]

 ([facility])

 

           Oxygen saturation in Arterial blood by Pulse oximetry 93 %           

                  93 %       [facility] 

([facility])

 

           Heart rate 96 /min                          96 /min    [facility] ([facility])

 

           Diastolic blood pressure 74 mm[Hg]                        74 mm[Hg]  

[facility] ([facility])

 

           Systolic blood pressure 122 mm[Hg]                       122 mm[Hg] [facility] ([facility])

 

           Body surface area Derived from formula 1.50 m2                       

   1.50 m2    [facility] ([facility])

 

           Body mass index (BMI) [Ratio] 18.3 kg/m2                       18.3 k

g/m2 [facility] ([facility])

 

           Body height 64 [in_i]                        64 [in_i]  [facility] ([facility])

 

                                        5'4" 

 

           Body weight 48.252 kg                        48.252 kg  [facility] ([facility])

 

           Body weight 106.38 [lb_av]                       106.38 [lb_av] [facility]

T ([facility])

 

           Oxygen saturation in Arterial blood by Pulse oximetry 95 %           

                  95 %       [facility] 

[facility]

 

           Respiratory rate 16 /min                          16 /min    [facility] (

[facility])

 

           Body temperature 97.2 [degF]                       97.2 [degF] [facility]

 ([facility])

 

           Heart rate 90 /min                          90 /min    [facility] ([facility])

 

           Diastolic blood pressure 64 mm[Hg]                        64 mm[Hg]  

[facility] ([facility])

 

           Systolic blood pressure 112 mm[Hg]                       112 mm[Hg] M

[facility] ([facility])

 

           Body surface area 1.50 m2                          1.50 m2    [facility] 

([facility])

 

           Body weight 51.313 kg                        51.313 kg  [facility] ([facility], )

 

           Body mass index (BMI) [Ratio] 19.4 kg/m2                       19.4 k

g/m2 [facility] ([facility], )

 

           Body weight 113.12 [lb_av]                       113.12 [lb_av] [facility]

T ([facility], )

 

           Body height 64 [in_i]                        64 [in_i]  [facility] ([facility], )

 

                                        " 

 

           Body temperature 98.3 [degF]                       98.3 [degF] [facility]

 ([facility],

 )

 

           Oxygen saturation in Arterial blood by Pulse oximetry 93 %           

                  93 %       [facility] 

([facility], )

 

           Heart rate 103 /min                         103 /min   [facility] ([facility], )

 

           Diastolic blood pressure 90 mm[Hg]                        90 mm[Hg]  

[facility] ([facility], )

 

           Systolic blood pressure 138 mm[Hg]                       138 mm[Hg] M

[facility] ([facility], )

 

           Body height 64 [in_i]                        64 [in_i]  [facility] ([facility], )

 

                                        5'4" 



                                                                                
                  



Patient Treatment Plan of Care

          



             Planned Activity Planned Date Details      Description  Data Source

(s)

 

                          Sulfamethoxazole 800 MG / Trimethoprim 160 MG Oral Tab

let 2020 12:00:00 AM

 EST                                                        eCW1 ([facility])

 

                          Sulfamethoxazole 800 MG / Trimethoprim 160 MG Oral Tab

let 2020 12:00:00 AM

 EST                                                        eCW1 ([facility])

 

                          Sulfamethoxazole 800 MG / Trimethoprim 160 MG Oral Tab

let 2020 12:00:00 AM

 EST                                                        eCW1 ([facility])

## 2021-02-12 NOTE — CCD
Summarization of Episode Note

                             Created on: 2021



LORI MARX

External Reference #: [id number]

: 1960

Sex: Male



Demographics





                          Address                   [address]

[place]  [address]

 

                          Home Phone                [phone]

 

                          Preferred Language        Unknown

 

                          Marital Status            Unknown

 

                          Religion Affiliation     Unknown

 

                          Race                      White

 

                          Ethnic Group              Not  or 





Author





                          Author                    [facility] [employer]

ems

 

                          Organization              [facility] [employer]

ems

 

                          Address                   Unknown

 

                          Phone                     Unavailable







Support





                Name            Relationship    Address         Phone

 

                    LORI MARX                [address]

[place], [place]  [address] [phone]

 

                    David Marx          [employer]                [address]

[place]  [address]                     [phone]







Care Team Providers





                    Care Team Member Name Role                Phone

 

                    Erick Pascual      Unavailable         [phone]







PROBLEMS





          Type      Condition ICD9-CM Code PZM25-NW Code Onset Dates Condition S

tatus W/U 

Status              Risk                SNOMED Code         Notes

 

             Problem      Benign prostatic hyperplasia with lower urinary tract 

symptoms              N40.1         

             Active       confirmed                 [id number]  

 

       Problem Balanitis        N48.1         Active confirmed        [id number]  







ALLERGIES

No Known Allergies



ENCOUNTERS from 1960 to 2021





             Encounter    Location     Date         Provider     Diagnosis

 

                [facility]    [address] [place] [address]     Erick Pascual

                                         







IMMUNIZATIONS

No Information



SOCIAL HISTORY

Tobacco Use:



                    Social History Observation Description         Date

 

                    Details (start date - stop date) Former Smoker        



Sex Assigned At Birth:



                          Social History Observation Description

 

                          Sex Assigned At Birth     Unknown



Sexual Hx:



                    Question            Answer              Notes

 

                    Had sex in the last 12 months (vaginal, oral, or anal)? Yes 

                 

 

                    Have you ever had an STD? Yes                  

 

                    with                Women only           

 

                    Use protection?     No                   



Alcohol Screening:



                    Question            Answer              Notes

 

                    Did you have a drink containing alcohol in the past year? No

                   

 

                    Points              0                    

 

                    Interpretation      Negative             



Tobacco Use:



                    Question            Answer              Notes

 

                    Are you a:          former smoker       quit 2 years ago







REASON FOR REFERRAL

No Information



VITAL SIGNS

No information



MEDICATIONS





           Medication SIG (Take, Route, Frequency, Duration) Notes      Start Da

te End Date   

Status

 

           Levalbuterol Tartrate 45 MCG/ACT 1 puff as needed Inhalation every 4 

hrs                                  

Active

 

           Omeprazole 40 MG 1 capsule Orally Once a day                         

         Not-Taking

 

                          Ketoconazole 2 %          1 application to affected ar

ea Externally Once a day for 30 

days                            29 Dec, 2016                    Not-Taking

 

           Mucinex 600 MG 1 tablet as needed Orally every 12 hrs                

                  Not-Taking

 

           Albuterol-Ipratropium 2.5-0.5 MG/3ML 3 ml Inhalation every 6 hrs     

                             Active

 

                Flonase Allergy Relief 50 MCG/ACT 1 spray in each nostril Nasall

y Once a day                  

                                                    Not-Taking

 

           Apple Cider Vinegar                                             Activ

e

 

           Multi Complete -  Orally                                     Not-Taki

ng

 

                Cipro 500 MG    1 tablet Orally ONE HOUR PRIOR TO PROCEDURE for 

1 dose(s)                 20 Mar,

2017                                                Not-Taking

 

           Flomax 0.4 MG 1 capsule Orally Once a day for 90 day(s)              

                    Active

 

                Ketoconazole 2 % 1 application to affected area Externally BID f

or 30 day(s)                 

                                        Active

 

           Budesonide 1 MG/2ML 4 ml Inhalation Four times a day                 

                 Active

 

           Spiriva HandiHaler                                             Active

 

           Simvastatin 20 MG 1 tablet in the evening Orally Once a day for 30 da

y(s)                                  

Active

 

           Diflucan 100 MG 1 tablet Orally Once a day for 7 day(s)            05

 Dec, 2016            

Not-Taking

 

           Stiolto Respimat 2.5-2.5 MCG/ACT 2 puffs Inhalation Once a day       

                           Active

 

                          Sulfamethoxazole-Trimethoprim 800-160 MG 1 tablet 1 ho

ur prior to your 

cystoscopy Orally Once for 1 days                 23 Dec, 2020                  

  Active

 

           Vitamin D3 Ultra Potency 1.25 MG (81872 UT) 1 tablet Orally for 30 da

y(s)                                  

Active

 

           Anoro Ellipta 62.5-25 MCG/INH 1 puff Inhalation Once a day           

                       Not-Taking

 

           Ginseng                                                Active

 

           Pantoprazole Sodium 40 MG 1 tablet Orally Once a day for 30 day(s)   

                               Active

 

           Aspir-Low 81 MG 1 tablet Orally Once a day for 30 day(s)             

                     Active

 

           Clotrimazole 10 MG 1 ed Mouth/Throat Five times a day for 14 day(

s)                                  

Active

 

                          Lidocaine HCl Jelly USP 2 % 5 ML1 application to affec

michael area as needed 

Intravesically TIME AT CYSTOSCOPY for 1 dose(s)                     

                Not-Taking

 

           BusPIRone HCl 7.5 MG 1 tablet Orally Twice a day                     

             Not-Taking

 

             Pepcid 20 MG 1 tablet at bedtime as needed Orally Once a day for 30

 day(s)                            

                                        Active

 

           Simethicone                                             Active







PROCEDURES

No Information



RESULTS

No Results



REASON FOR VISIT

refill Flomax



MEDICAL (GENERAL) HISTORY





                    Type                Description         Date

 

                    Medical History     COPD                 

 

                    Medical History     GERD                 

 

                    Surgical History    colonoscopy         

 

                    Surgical History    hemorroidectomy      

 

                    Surgical History    tonsil removed at 13  







Goals Section

No Information



Health Concerns

No Information



MEDICAL EQUIPMENT

No Information



MENTAL STATUS

No Information



FUNCTIONAL STATUS

No Information



ASSESSMENTS

No Information



PLAN OF TREATMENT

Medication



                Medication Name Sig             Start Date      Stop Date

 

                          Sulfamethoxazole-Trimethoprim 800-160 MG 1 tablet 1 ho

ur prior to your 

cystoscopy Orally Once for 1 days 23 Dec, 2020               

 

                    Ketoconazole 2 %    1 application to affected area Externall

y BID for 30 day(s)                                 

 

                Flomax 0.4 MG   1 capsule Orally Once a day for 90 day(s)       

           



Next Appt



                                        Details

 

                                        Provider Name:Martin Finney,  08:00:00 AM, [address], 

[place], [address], [phone]







Insurance Providers





             Payer Name   Payer Address Payer Phone  Insured Name Patient Relati

onship to 

Insured                   Coverage Start Date       Coverage End Date

 

                [employer] [place] [employer] [facility][note 2[facility] [address]  [place] [address] [phone]    

LORI MARX       self

## 2021-02-12 NOTE — ROOR
________________________________________________________________________________

Patient Name: Neftaly Guillory              Procedure Date: 2/12/2021 10:39 AM

MRN: [id number]                          Account Number: [id number]

YOB: 1960               Age: 60

Room: [facility]                            Gender: Male

Note Status: Finalized                 

________________________________________________________________________________

 

Procedure:            Colonoscopy

Indications:          Screening for colorectal malignant neoplasm

Providers:            Rc Bergman MD

Referring MD:         Daisy Braun

Requesting Provider:  

Medicines:            Monitored Anesthesia Care

Complications:        No immediate complications.

________________________________________________________________________________

Procedure:            Pre-Anesthesia Assessment:

                      - Prior to the procedure, a History and Physical was 

                      performed, and patient medications and allergies were 

                      reviewed. The patient is competent. The risks and 

                      benefits of the procedure and the sedation options and 

                      risks were discussed with the patient. All questions 

                      were answered and informed consent was obtained. Patient 

                      identification and proposed procedure were verified by 

                      the physician, the nurse and the anesthesiologist in the 

                      procedure room. Mental Status Examination: alert and 

                      oriented. Airway Examination: normal oropharyngeal 

                      airway and neck mobility. Respiratory Examination: clear 

                      to auscultation. CV Examination: normal. Prophylactic 

                      Antibiotics: The patient does not require prophylactic 

                      antibiotics. Prior Anticoagulants: The patient has taken 

                      no previous anticoagulant or antiplatelet agents. ASA 

                      Grade Assessment: II - A patient with mild systemic 

                      disease. After reviewing the risks and benefits, the 

                      patient was deemed in satisfactory condition to undergo 

                      the procedure. The anesthesia plan was to use monitored 

                      anesthesia care (MAC). Immediately prior to 

                      administration of medications, the patient was 

                      re-assessed for adequacy to receive sedatives. The heart 

                      rate, respiratory rate, oxygen saturations, blood 

                      pressure, adequacy of pulmonary ventilation, and 

                      response to care were monitored throughout the 

                      procedure. The physical status of the patient was 

                      re-assessed after the procedure.

                      The Colonoscope was introduced through the anus and 

                      advanced to the terminal ileum, with identification of 

                      the appendiceal orifice and IC valve. The colonoscopy 

                      was performed without difficulty. The patient tolerated 

                      the procedure well. The quality of the bowel preparation 

                      was good. The terminal ileum, ileocecal valve, 

                      appendiceal orifice, and rectum were photographed. Scope 

                      insertion time was 2 minutes. Scope withdrawal time was 

                      9 minutes. The total duration of the procedure was 11 

                      minutes.

                                                                                

Findings:

     The perianal and digital rectal examinations were normal.

     Two sessile polyps were found in the ascending colon and cecum. The 

     polyps were 5 to 8 mm in size. These polyps were removed with a cold 

     snare. Resection and retrieval were complete. Verification of patient 

     identification for the specimen was done by the physician and nurse using 

     the patient's name, birth date and medical record number. Estimated blood 

     loss was minimal.

     A few small-mouthed diverticula were found in the sigmoid colon. There 

     was no evidence of diverticular bleeding.

     A 3 mm polyp was found in the rectum. The polyp was sessile. The polyp 

     was removed with a cold biopsy forceps. Resection and retrieval were 

     complete.

     The colon (entire examined portion) was significantly tortuous.

     Non-bleeding external and internal hemorrhoids were found during 

     retroflexion. The hemorrhoids were large.

                                                                                

Impression:           - Two 5 to 8 mm polyps in the ascending colon and in the 

                      cecum, removed with a cold snare. Resected and retrieved.

                      - Mild diverticulosis in the sigmoid colon. There was no 

                      evidence of diverticular bleeding.

                      - One 3 mm polyp in the rectum, removed with a cold 

                      biopsy forceps. Resected and retrieved.

                      - Tortuous colon.

                      - Non-bleeding external and internal hemorrhoids.

Recommendation:       - Patient has a contact number available for 

                      emergencies. The signs and symptoms of potential delayed 

                      complications were discussed with the patient. Return to 

                      normal activities tomorrow. Written discharge 

                      instructions were provided to the patient.

                      - High fiber diet.

                      - Continue present medications.

                      - Use fiber, for example Citrucel, Fibercon, Konsyl or 

                      Metamucil.

                      - Preparation H ointment: Apply externally daily for 5 

                      days.

                      - Await pathology results.

                      - Repeat colonoscopy in 5-10 years for surveillance 

                      based on pathology results.

                      - Telephone GI clinic for pathology results in 2 weeks.

                      - Return to primary care physician.

                                                                                

Procedure Code(s):    --- Professional ---

                      86601, Colonoscopy, flexible; with removal of tumor(s), 

                      polyp(s), or other lesion(s) by snare technique

                      50808, 59, Colonoscopy, flexible; with biopsy, single or 

                      multiple

Diagnosis Code(s):    --- Professional ---

                      Z12.11, Encounter for screening for malignant neoplasm 

                      of colon

                      K63.5, Polyp of colon

                      K64.8, Other hemorrhoids

                      K62.1, Rectal polyp

                      K57.30, Diverticulosis of large intestine without 

                      perforation or abscess without bleeding

                      Q43.8, Other specified congenital malformations of 

                      intestine

 

CPT copyright 2019 [employer]. All rights reserved.

 

The codes documented in this report are preliminary and upon  review may 

be revised to meet current compliance requirements.

 

Rc Bergman MD

_______________________

Rc Bergman MD

2/12/2021 11:44:34 AM

Electronically signed by Rc Bergman MD

Number of Addenda: 0

 

Note Initiated On: 2/12/2021 10:39 AM

Estimated Blood Loss: Estimated blood loss was minimal.

## 2021-02-12 NOTE — ROOR
________________________________________________________________________________

Patient Name: Neftaly Guillory              Procedure Date: 2/12/2021 10:38 AM

MRN: [id number]                          Account Number: [id number]

YOB: 1960               Age: 60

Room: [facility]                            Gender: Male

Note Status: Finalized                 

________________________________________________________________________________

 

Procedure:            Upper GI endoscopy

Indications:          Dyspepsia, Dysphagia

Providers:            Rc Bergman MD

Referring MD:         Daisy Braun

Requesting Provider:  

Medicines:            Monitored Anesthesia Care

Complications:        No immediate complications.

________________________________________________________________________________

Procedure:            Pre-Anesthesia Assessment:

                      - Prior to the procedure, a History and Physical was 

                      performed, and patient medications and allergies were 

                      reviewed. The patient is competent. The risks and 

                      benefits of the procedure and the sedation options and 

                      risks were discussed with the patient. All questions 

                      were answered and informed consent was obtained. Patient 

                      identification and proposed procedure were verified by 

                      the physician, the nurse and the anesthesiologist in the 

                      procedure room. Mental Status Examination: alert and 

                      oriented. Airway Examination: normal oropharyngeal 

                      airway and neck mobility. Respiratory Examination: clear 

                      to auscultation. CV Examination: normal. Prophylactic 

                      Antibiotics: The patient does not require prophylactic 

                      antibiotics. Prior Anticoagulants: The patient has taken 

                      no previous anticoagulant or antiplatelet agents. ASA 

                      Grade Assessment: II - A patient with mild systemic 

                      disease. After reviewing the risks and benefits, the 

                      patient was deemed in satisfactory condition to undergo 

                      the procedure. The anesthesia plan was to use monitored 

                      anesthesia care (MAC). Immediately prior to 

                      administration of medications, the patient was 

                      re-assessed for adequacy to receive sedatives. The heart 

                      rate, respiratory rate, oxygen saturations, blood 

                      pressure, adequacy of pulmonary ventilation, and 

                      response to care were monitored throughout the 

                      procedure. The physical status of the patient was 

                      re-assessed after the procedure.

                      The Endoscope was introduced through the mouth, and 

                      advanced to the second part of duodenum. The upper GI 

                      endoscopy was accomplished without difficulty. The 

                      patient tolerated the procedure well.

                                                                                

Findings:

     The examined esophagus was normal.

     The Z-line was regular and was found 45 cm from the incisors.

     Patchy mild inflammation characterized by erythema, friability and 

     granularity was found in the gastric body and in the gastric antrum. 

     Biopsies were taken with a cold forceps for histology. Biopsies were 

     taken with a cold forceps for Helicobacter pylori testing. Verification 

     of patient identification for the specimen was done by the physician and 

     nurse using the patient's name, birth date and medical record number. 

     Estimated blood loss was minimal.

     The duodenal bulb and second portion of the duodenum were normal. 

     Biopsies for histology were taken with a cold forceps for evaluation of 

     celiac disease.

                                                                                

Impression:           - Normal esophagus.

                      - Z-line regular, 45 cm from the incisors.

                      - Gastritis. Biopsied.

                      - Normal duodenal bulb and second portion of the 

                      duodenum. Biopsied.

Recommendation:       - Patient has a contact number available for 

                      emergencies. The signs and symptoms of potential delayed 

                      complications were discussed with the patient. Return to 

                      normal activities tomorrow. Written discharge 

                      instructions were provided to the patient.

                      - High fiber diet.

                      - Continue present medications.

                      - Await pathology results.

                      - Follow an antireflux regimen.

                      - Perform ambulatory esophageal manometry if symptoms 

                      persist.

                      - Telephone GI clinic for pathology results in 2 weeks.

                      - Return to primary care physician.

                                                                                

Procedure Code(s):    --- Professional ---

                      09609, Esophagogastroduodenoscopy, flexible, transoral; 

                      with biopsy, single or multiple

Diagnosis Code(s):    --- Professional ---

                      K29.70, Gastritis, unspecified, without bleeding

                      R10.13, Epigastric pain

                      R13.10, Dysphagia, unspecified

 

CPT copyright 2019 [employer]. All rights reserved.

 

The codes documented in this report are preliminary and upon  review may 

be revised to meet current compliance requirements.

 

Rc Bergman MD

_______________________

Rc Bergman MD

2/12/2021 10:58:13 AM

Electronically signed by Rc Bergman MD

Number of Addenda: 0

 

Note Initiated On: 2/12/2021 10:38 AM

Estimated Blood Loss: Estimated blood loss was minimal.

## 2021-02-12 NOTE — CCD
Summarization of Episode Note

                             Created on: 2021



LORI GUILLORY

External Reference #: [id number]

: 1960

Sex: Male



Demographics





                          Address                   [address]

[place]  [address]

 

                          Home Phone                [phone]

 

                          Preferred Language        Unknown

 

                          Marital Status            Unknown

 

                          Taoist Affiliation     Unknown

 

                          Race                      White

 

                          Ethnic Group              Not  or 





Author





                          Author                    [facility]

ems

 

                          Organization              [facility]

ems

 

                          Address                   Unknown

 

                          Phone                     Unavailable







Support





                Name            Relationship    Address         Phone

 

                    LORI GUILLORY                [address]

[place], [place]  [id number] [phone]

 

                    David Guillory          [employer]                [address]

[place]  [address]                     [phone]







Care Team Providers





                    Care Team Member Name Role                Phone

 

                    Erick Pascual      Unavailable         [phone]







PROBLEMS





          Type      Condition ICD9-CM Code SUI93-UC Code Onset Dates Condition S

tatus W/U 

Status              Risk                SNOMED Code         Notes

 

             Problem      Benign prostatic hyperplasia with lower urinary tract 

symptoms              N40.1         

             Active       confirmed                 [id number]  

 

       Problem Balanitis        N48.1         Active confirmed        [id number]  







ALLERGIES

No Known Allergies



ENCOUNTERS from 1960 to 2021





             Encounter    Location     Date         Provider     Diagnosis

 

                [facility]    [address] [place] [address]     Erick Pascual

                                         







IMMUNIZATIONS

No Information



SOCIAL HISTORY

Tobacco Use:



                    Social History Observation Description         Date

 

                    Details (start date - stop date) Former Smoker        



Sex Assigned At Birth:



                          Social History Observation Description

 

                          Sex Assigned At Birth     Unknown



Sexual Hx:



                    Question            Answer              Notes

 

                    Had sex in the last 12 months (vaginal, oral, or anal)? Yes 

                 

 

                    Have you ever had an STD? Yes                  

 

                    with                Women only           

 

                    Use protection?     No                   



Alcohol Screening:



                    Question            Answer              Notes

 

                    Did you have a drink containing alcohol in the past year? No

                   

 

                    Points              0                    

 

                    Interpretation      Negative             



Tobacco Use:



                    Question            Answer              Notes

 

                    Are you a:          former smoker       quit 2 years ago







REASON FOR REFERRAL

No Information



VITAL SIGNS

No information



MEDICATIONS





           Medication SIG (Take, Route, Frequency, Duration) Notes      Start Da

te End Date   

Status

 

           Levalbuterol Tartrate 45 MCG/ACT 1 puff as needed Inhalation every 4 

hrs                                  

Active

 

           Omeprazole 40 MG 1 capsule Orally Once a day                         

         Not-Taking

 

                          Ketoconazole 2 %          1 application to affected ar

ea Externally Once a day for 30 

days                            29 Dec, 2016                    Not-Taking

 

           Mucinex 600 MG 1 tablet as needed Orally every 12 hrs                

                  Not-Taking

 

           Albuterol-Ipratropium 2.5-0.5 MG/3ML 3 ml Inhalation every 6 hrs     

                             Active

 

                Flonase Allergy Relief 50 MCG/ACT 1 spray in each nostril Nasall

y Once a day                  

                                                    Not-Taking

 

           Apple Cider Vinegar                                             Activ

e

 

           Multi Complete -  Orally                                     Not-Taki

ng

 

                Cipro 500 MG    1 tablet Orally ONE HOUR PRIOR TO PROCEDURE for 

1 dose(s)                 20 Mar,

2017                                                Not-Taking

 

           Flomax 0.4 MG 1 capsule Orally Once a day for 90 day(s)              

                    Active

 

                Ketoconazole 2 % 1 application to affected area Externally BID f

or 30 day(s)                 

                                        Active

 

           Budesonide 1 MG/2ML 4 ml Inhalation Four times a day                 

                 Active

 

           Spiriva HandiHaler                                             Active

 

           Simvastatin 20 MG 1 tablet in the evening Orally Once a day for 30 da

y(s)                                  

Active

 

           Diflucan 100 MG 1 tablet Orally Once a day for 7 day(s)            05

 Dec, 2016            

Not-Taking

 

           Stiolto Respimat 2.5-2.5 MCG/ACT 2 puffs Inhalation Once a day       

                           Active

 

                          Sulfamethoxazole-Trimethoprim 800-160 MG 1 tablet 1 ho

ur prior to your 

cystoscopy Orally Once for 1 days                 23 Dec, 2020                  

  Active

 

           Vitamin D3 Ultra Potency 1.25 MG (81347 UT) 1 tablet Orally for 30 da

y(s)                                  

Active

 

           Anoro Ellipta 62.5-25 MCG/INH 1 puff Inhalation Once a day           

                       Not-Taking

 

           Ginseng                                                Active

 

           Pantoprazole Sodium 40 MG 1 tablet Orally Once a day for 30 day(s)   

                               Active

 

           Aspir-Low 81 MG 1 tablet Orally Once a day for 30 day(s)             

                     Active

 

           Clotrimazole 10 MG 1 ed Mouth/Throat Five times a day for 14 day(

s)                                  

Active

 

                          Lidocaine HCl Jelly USP 2 % 5 ML1 application to affec

michael area as needed 

Intravesically TIME AT CYSTOSCOPY for 1 dose(s)                     

                Not-Taking

 

           BusPIRone HCl 7.5 MG 1 tablet Orally Twice a day                     

             Not-Taking

 

             Pepcid 20 MG 1 tablet at bedtime as needed Orally Once a day for 30

 day(s)                            

                                        Active

 

           Simethicone                                             Active







PROCEDURES

No Information



RESULTS

No Results



REASON FOR VISIT

Cancel Cysto



MEDICAL (GENERAL) HISTORY





                    Type                Description         Date

 

                    Medical History     COPD                 

 

                    Medical History     GERD                 

 

                    Surgical History    colonoscopy         

 

                    Surgical History    hemorroidectomy      

 

                    Surgical History    tonsil removed at 13  







Goals Section

No Information



Health Concerns

No Information



MEDICAL EQUIPMENT

No Information



MENTAL STATUS

No Information



FUNCTIONAL STATUS

No Information



ASSESSMENTS

No Information



PLAN OF TREATMENT

Medication



                Medication Name Sig             Start Date      Stop Date

 

                          Sulfamethoxazole-Trimethoprim 800-160 MG 1 tablet 1 ho

ur prior to your 

cystoscopy Orally Once for 1 days 23 Dec, 2020               

 

                    Ketoconazole 2 %    1 application to affected area Externall

y BID for 30 day(s)                                 

 

                Flomax 0.4 MG   1 capsule Orally Once a day for 90 day(s)       

           







Insurance Providers





             Payer Name   Payer Address Payer Phone  Insured Name Patient Relati

onship to 

Insured                   Coverage Start Date       Coverage End Date

 

                [employer] [place] [address]  [place] [address] [phone]    

LORI GUILLORY       self

## 2021-03-18 ENCOUNTER — HOSPITAL ENCOUNTER (OUTPATIENT)
Dept: HOSPITAL 53 - M CARPUL | Age: 61
End: 2021-03-18
Attending: SPECIALIST
Payer: COMMERCIAL

## 2021-03-18 DIAGNOSIS — J44.9: Primary | ICD-10-CM

## 2021-03-18 NOTE — PFTRPT
Site: [facility], [address] [place] [address], [id number]

ID: [id number]  Name: LORI MARX

Visit Date: 2021  Second ID: [id number]

Referring Doctor: Nicole Nath MD

Reviewing Doctor: Reji Khoury MD

Technician: LEVI ANDERSON, CRISTOBAL

Age: 61  : 1960  Sex: Male  Race: 

Height: 64.00  Inches  Weight: 106.00  Lbs  BSA: 1.49

Order IDs: [id number]

Requested Test(s): <RESP-PFT.PFT B/A>

Diagnosis: J44.9



of albuterol for post bronchodilator.  The results of this test appear to be 

valid, although the ATS standard for "end of test" was not met.

Review Status: Not Reviewed

 

                                        Pre-Bronch    Post-Bronch

                                 Pred  Actual %Pred  Actual %Chng

SPIROMETRY

FVC (L)                          3.80   1.46     38   1.95     33 

FEV1 (L)                         2.88   0.61     21   0.81     33 

FEV1/FVC (%)                       76     42     54     41        

FEF 25% (L/sec)                  5.81   0.45      7   0.84     84 

FEF 50% (L/sec)                  3.82   0.26      6   0.48     83 

FEF 75% (L/sec)                  1.08   0.16     14   0.33    112 

FEF 25-75% (L/sec)               2.43   0.24      9   0.46     92 

FEF Max (L/sec)                  7.92   2.43     30   2.81     15 

FIVC (L)                                1.38          1.96     42 

FIF 50% (L/sec)                  4.72   2.89     61   3.12      7 

FIF Max (L/sec)                         3.06          3.12      1 

MVV (L/min)                       120     20     16               

Expiratory Time (sec)                   6.67          6.76      1 

Back Extrap Vol (L)                     0.02          0.02    -23 

Time To FEFmax (sec)                   0.044         0.047      8 

LUNG VOLUMES

SVC (L)                          3.87   2.24     57               

IC (L)                           2.86   1.17     40               

ERV (L)                          1.01   1.08    106               

TGV (L)                          2.92   7.61    260               

RV (Pleth) (L)                   1.91   6.53    341               

TLC (Pleth) (L)                  5.78   8.77    151               

RV/TLC (Pleth) (%)                 33     74    225               

DIFFUSION

DLCOunc (ml/min/mmHg)           25.91   7.84     30               

DLCOcor (ml/min/mmHg)           25.91   7.67     29               

DL/VA (ml/min/mmHg/L)            4.48   2.02     45               

VA (L)                           5.78   3.80     65               

BHT (sec)                              11.56                      

IVC (L)                                 1.63                      

TLC (SB) (L)                            3.95                      

AIRWAYS RESISTANCE

Raw (cmH2O/L/s)                  1.45   2.22    153               

Gaw (L/s/cmH2O)                  1.03   0.46     44               

sRaw (cmH2O*s)                   4.76  18.20    382               

sGaw (1/cmH2O*s)                 0.20   0.06     27               

BLOOD GASES

Hgb (gm/dL)                             15.4

## 2021-07-13 ENCOUNTER — HOSPITAL ENCOUNTER (OUTPATIENT)
Dept: HOSPITAL 53 - M RAD | Age: 61
End: 2021-07-13
Attending: SPECIALIST
Payer: COMMERCIAL

## 2021-07-13 DIAGNOSIS — Z87.891: Primary | ICD-10-CM

## 2022-03-21 ENCOUNTER — HOSPITAL ENCOUNTER (OUTPATIENT)
Dept: HOSPITAL 53 - M RAD | Age: 62
End: 2022-03-21
Attending: INTERNAL MEDICINE
Payer: COMMERCIAL

## 2022-03-21 DIAGNOSIS — R14.0: Primary | ICD-10-CM

## 2022-09-21 ENCOUNTER — HOSPITAL ENCOUNTER (OUTPATIENT)
Dept: HOSPITAL 53 - M RAD | Age: 62
End: 2022-09-21
Attending: STUDENT IN AN ORGANIZED HEALTH CARE EDUCATION/TRAINING PROGRAM
Payer: COMMERCIAL

## 2022-09-21 DIAGNOSIS — Z87.891: Primary | ICD-10-CM
